# Patient Record
Sex: FEMALE | Race: BLACK OR AFRICAN AMERICAN | NOT HISPANIC OR LATINO | Employment: STUDENT | URBAN - METROPOLITAN AREA
[De-identification: names, ages, dates, MRNs, and addresses within clinical notes are randomized per-mention and may not be internally consistent; named-entity substitution may affect disease eponyms.]

---
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
## 2019-09-11 ENCOUNTER — APPOINTMENT (OUTPATIENT)
Dept: ULTRASOUND IMAGING | Facility: CLINIC | Age: 19
End: 2019-09-11
Attending: EMERGENCY MEDICINE
Payer: COMMERCIAL

## 2019-09-11 ENCOUNTER — HOSPITAL ENCOUNTER (EMERGENCY)
Facility: CLINIC | Age: 19
Discharge: HOME | End: 2019-09-11
Attending: EMERGENCY MEDICINE | Admitting: EMERGENCY MEDICINE
Payer: COMMERCIAL

## 2019-09-11 VITALS
WEIGHT: 155 LBS | SYSTOLIC BLOOD PRESSURE: 101 MMHG | HEART RATE: 64 BPM | OXYGEN SATURATION: 100 % | TEMPERATURE: 98.2 F | BODY MASS INDEX: 24.28 KG/M2 | DIASTOLIC BLOOD PRESSURE: 51 MMHG | RESPIRATION RATE: 18 BRPM

## 2019-09-11 DIAGNOSIS — O03.9 SPONTANEOUS ABORTION: ICD-10-CM

## 2019-09-11 LAB
ABO + RH BLD: NORMAL
ABO + RH BLD: NORMAL
ALBUMIN UR-MCNC: 10 MG/DL
ANION GAP SERPL CALCULATED.3IONS-SCNC: 11 MMOL/L (ref 3–14)
APPEARANCE UR: CLEAR
B-HCG SERPL-ACNC: 8662 IU/L (ref 0–5)
BACTERIA #/AREA URNS HPF: ABNORMAL /HPF
BASOPHILS # BLD AUTO: 0 10E9/L (ref 0–0.2)
BASOPHILS NFR BLD AUTO: 0.2 %
BILIRUB UR QL STRIP: NEGATIVE
BLD GP AB SCN SERPL QL: NORMAL
BLOOD BANK CMNT PATIENT-IMP: NORMAL
BUN SERPL-MCNC: 9 MG/DL (ref 7–30)
CALCIUM SERPL-MCNC: 8.7 MG/DL (ref 8.5–10.1)
CHLORIDE SERPL-SCNC: 106 MMOL/L (ref 94–109)
CO2 SERPL-SCNC: 24 MMOL/L (ref 20–32)
COLOR UR AUTO: YELLOW
CREAT SERPL-MCNC: 0.69 MG/DL (ref 0.52–1.04)
DIFFERENTIAL METHOD BLD: ABNORMAL
EOSINOPHIL # BLD AUTO: 0 10E9/L (ref 0–0.7)
EOSINOPHIL NFR BLD AUTO: 0.7 %
ERYTHROCYTE [DISTWIDTH] IN BLOOD BY AUTOMATED COUNT: 13 % (ref 10–15)
GFR SERPL CREATININE-BSD FRML MDRD: >90 ML/MIN/{1.73_M2}
GLUCOSE SERPL-MCNC: 85 MG/DL (ref 70–99)
GLUCOSE UR STRIP-MCNC: NEGATIVE MG/DL
HCT VFR BLD AUTO: 34.5 % (ref 35–47)
HGB BLD-MCNC: 11.6 G/DL (ref 11.7–15.7)
HGB UR QL STRIP: ABNORMAL
IMM GRANULOCYTES # BLD: 0 10E9/L (ref 0–0.4)
IMM GRANULOCYTES NFR BLD: 0.2 %
KETONES UR STRIP-MCNC: NEGATIVE MG/DL
LEUKOCYTE ESTERASE UR QL STRIP: ABNORMAL
LYMPHOCYTES # BLD AUTO: 1.8 10E9/L (ref 0.8–5.3)
LYMPHOCYTES NFR BLD AUTO: 32 %
MCH RBC QN AUTO: 26.7 PG (ref 26.5–33)
MCHC RBC AUTO-ENTMCNC: 33.6 G/DL (ref 31.5–36.5)
MCV RBC AUTO: 79 FL (ref 78–100)
MONOCYTES # BLD AUTO: 0.4 10E9/L (ref 0–1.3)
MONOCYTES NFR BLD AUTO: 7.2 %
MUCOUS THREADS #/AREA URNS LPF: PRESENT /LPF
NEUTROPHILS # BLD AUTO: 3.3 10E9/L (ref 1.6–8.3)
NEUTROPHILS NFR BLD AUTO: 59.7 %
NITRATE UR QL: NEGATIVE
NRBC # BLD AUTO: 0 10*3/UL
NRBC BLD AUTO-RTO: 0 /100
PH UR STRIP: 6 PH (ref 5–7)
PLATELET # BLD AUTO: 217 10E9/L (ref 150–450)
POTASSIUM SERPL-SCNC: 3.4 MMOL/L (ref 3.4–5.3)
RBC # BLD AUTO: 4.35 10E12/L (ref 3.8–5.2)
RBC #/AREA URNS AUTO: 1 /HPF (ref 0–2)
SODIUM SERPL-SCNC: 141 MMOL/L (ref 133–144)
SOURCE: ABNORMAL
SP GR UR STRIP: 1.02 (ref 1–1.03)
SPECIMEN EXP DATE BLD: NORMAL
SQUAMOUS #/AREA URNS AUTO: 8 /HPF (ref 0–1)
UROBILINOGEN UR STRIP-MCNC: NORMAL MG/DL (ref 0–2)
WBC # BLD AUTO: 5.6 10E9/L (ref 4–11)
WBC #/AREA URNS AUTO: 5 /HPF (ref 0–5)

## 2019-09-11 PROCEDURE — 86900 BLOOD TYPING SEROLOGIC ABO: CPT | Performed by: EMERGENCY MEDICINE

## 2019-09-11 PROCEDURE — 85025 COMPLETE CBC W/AUTO DIFF WBC: CPT | Performed by: EMERGENCY MEDICINE

## 2019-09-11 PROCEDURE — 86850 RBC ANTIBODY SCREEN: CPT | Performed by: EMERGENCY MEDICINE

## 2019-09-11 PROCEDURE — 99284 EMERGENCY DEPT VISIT MOD MDM: CPT | Mod: Z6 | Performed by: EMERGENCY MEDICINE

## 2019-09-11 PROCEDURE — 84702 CHORIONIC GONADOTROPIN TEST: CPT | Performed by: EMERGENCY MEDICINE

## 2019-09-11 PROCEDURE — 99284 EMERGENCY DEPT VISIT MOD MDM: CPT | Mod: 25 | Performed by: EMERGENCY MEDICINE

## 2019-09-11 PROCEDURE — 76801 OB US < 14 WKS SINGLE FETUS: CPT

## 2019-09-11 PROCEDURE — 25000132 ZZH RX MED GY IP 250 OP 250 PS 637: Performed by: EMERGENCY MEDICINE

## 2019-09-11 PROCEDURE — 86901 BLOOD TYPING SEROLOGIC RH(D): CPT | Performed by: EMERGENCY MEDICINE

## 2019-09-11 PROCEDURE — 81001 URINALYSIS AUTO W/SCOPE: CPT | Performed by: EMERGENCY MEDICINE

## 2019-09-11 PROCEDURE — 80048 BASIC METABOLIC PNL TOTAL CA: CPT | Performed by: EMERGENCY MEDICINE

## 2019-09-11 RX ORDER — OXYCODONE HYDROCHLORIDE 5 MG/1
5 TABLET ORAL ONCE
Status: COMPLETED | OUTPATIENT
Start: 2019-09-11 | End: 2019-09-11

## 2019-09-11 RX ADMIN — OXYCODONE HYDROCHLORIDE 5 MG: 5 TABLET ORAL at 02:33

## 2019-09-11 ASSESSMENT — ENCOUNTER SYMPTOMS
DIZZINESS: 1
LIGHT-HEADEDNESS: 1
CHILLS: 1

## 2019-09-11 NOTE — ED AVS SNAPSHOT
[facility], Emergency Department  [address]  [facility] [address]  Phone:  [phone]  Fax:  [phone]                                    Maynor Palomo   MRN: [id number]    Department:  [facility], Emergency Department   Date of Visit:  9/11/2019           After Visit Summary Signature Page    I have received my discharge instructions, and my questions have been answered. I have discussed any challenges I see with this plan with the nurse or doctor.    ..........................................................................................................................................  Patient/Patient Representative Signature      ..........................................................................................................................................  Patient Representative Print Name and Relationship to Patient    ..................................................               ................................................  Date                                   Time    ..........................................................................................................................................  Reviewed by Signature/Title    ...................................................              ..............................................  Date                                               Time          22EPIC Rev 08/18

## 2019-09-11 NOTE — DISCHARGE INSTRUCTIONS
Please make an appointment to follow up with OB/Gyn--[facility] (phone: [phone]) today to get an appointment by the end of the week    Continue take ibuprofen as you are directed    Return to the emergency department if you develop worsening pain, bleeding, fevers, chills or other concerning symptoms

## 2019-09-11 NOTE — ED PROVIDER NOTES
"    [facility] EMERGENCY DEPARTMENT ([facility])    19        History     Chief Complaint   Patient presents with     Vaginal Bleeding     Pt is 11-12 weeks pregnant, bleeding began 4 days ago, heavy bleeding today. Unsure how many pads she has used in last hour but states she has been saturating them. Endorses abd pain & dizziness.     The history is provided by the patient, a significant other and medical records.     Maynor Palomo is a 19 year old  female who is 7w1d pregnant by US (9/10/2019) with a past medical history significant for depression and PTSD who presents here to the Emergency Department due to vaginal bleeding.    Per chart review patient had presented to [facility] ED on 9/10/2019 due to vaginal bleeding. The patient had reported a 1 month history of vaginal bleeding in pregnancy. Patient was evaluated on  having an US done which demonstrated a single IUP. She was then again evaluated on  due to continued bleeding and US showed embryonic demise. Patient was told to follow up with OB/GYN on 2019 (for which she never did). Patient had called 911 on the morning of 9/10/2019 due to increased vaginal bleeding. Patients Hgb at the time dropped down to 10.9 and also had a drop in her beta-hCG. She was found to have moderate to mild bleeding but once that was cleared away she had no active bleeding. Pelvic US was done which showed a fluid collection in the endometrial canal that was believed to represent a spontaneous . OB/GYN was consulted and offered medical management to help expel the products of conception, D&C, or discharge home. Patient elected to go home with pain control and follow up with OB/GYN.    Here in the ED patient reports that she has been bleeding for the past 4 days. States yesterday the bleeding was so bad it felt like \"I peed myself and had blood rushing down my legs\". Patient notes that today is like a rerun of yesterday. " She believes she is going through 3-4 pads in an hour. She notes she is currently still bleeding. Patient has been passing clots but is unsure about tissue. Notes she is also lightheaded and dizzy. Patient is also endorsing vaginal stabbing pains and chills.    I have reviewed the Medications, Allergies, Past Medical and Surgical History, and Social History in the [employer] system.    Past Medical History:   Diagnosis Date     Constipation      Depressive disorder      Migraines        Past Surgical History:   Procedure Laterality Date     BIOPSY LYMPH NODE CERVICAL       LAPAROSCOPIC APPENDECTOMY N/A 1/4/2017    Procedure: LAPAROSCOPIC APPENDECTOMY;  Surgeon: Erick Casper MD;  Location: [facility] OR     TONSILLECTOMY, ADENOIDECTOMY WITH SHAVER, COMBINED         Family History   Problem Relation Age of Onset     Depression Mother      Substance Abuse Mother         sober 6 yrs     Depression Father      Mental Illness Father         anger issues     Substance Abuse Father         current user     Depression Maternal Grandmother      Substance Abuse Maternal Grandmother      Mental Illness Paternal Grandmother      Substance Abuse Paternal Grandmother      Mental Illness Paternal Grandfather      Substance Abuse Paternal Grandfather      Mental Illness Paternal Aunt      Substance Abuse Paternal Aunt      Mental Illness Paternal Uncle      Substance Abuse Paternal Uncle        Social History     Tobacco Use     Smoking status: Current Every Day Smoker     Smokeless tobacco: Never Used   Substance Use Topics     Alcohol use: Not Currently     Alcohol/week: 0.0 oz     Comment: last-2 weeks ago       No current facility-administered medications for this encounter.      Current Outpatient Medications   Medication     atropine 1 % ophthalmic solution     CloNIDine ER (KAPVAY) 0.1 MG *ER* 12-hr tablet     erythromycin (ROMYCIN) ophthalmic ointment     sertraline (ZOLOFT) 100 MG tablet     traZODone (DESYREL) 100 MG tablet      Facility-Administered Medications Ordered in Other Encounters   Medication     acetaminophen (TYLENOL) tablet 650 mg     calcium carbonate (TUMS) chewable tablet 500-1,000 mg     ibuprofen (ADVIL,MOTRIN) tablet 400 mg        Allergies   Allergen Reactions     Lactose GI Disturbance     Naproxen      Penicillins          Review of Systems   Constitutional: Positive for chills.   Genitourinary: Positive for vaginal bleeding and vaginal pain (Stabing).   Neurological: Positive for dizziness and light-headedness.   All other systems reviewed and are negative.      Physical Exam   BP: 106/76  Heart Rate: 94  Temp: 98.2  F (36.8  C)  Resp: 18  Weight: 70.3 kg (155 lb)  SpO2: 99 %      Physical Exam  Physical Exam   Constitutional: oriented to person, place, and time. appears well-developed and well-nourished.   HENT:   Head: Normocephalic and atraumatic.   Neck: Normal range of motion.   Pulmonary/Chest: Effort normal. No respiratory distress.   Cardiac: No murmurs, rubs, gallops. RRR.  Abdominal: Abdomen soft, nontender, nondistended. No rebound tenderness.  MSK: Long bones without deformity or evidence of trauma  Neurological: alert and oriented to person, place, and time.   Skin: Skin is warm and dry.   Psychiatric:  normal mood and affect.  behavior is normal. Thought content normal.   Gyn: Scant blood in the vaginal vault. Os appears closed. No active bleeding. No tissue noted.    ED Course   1:41 AM  The patient was seen and examined by Shayne Calhoun MD in Room ED05.        Procedures  Results for orders placed or performed during the hospital encounter of 19   US OB <14 Weeks W Transvaginal    Narrative    ULTRASOUND OBSTETRIC FIRST TRIMESTER WITH TRANSVAGINAL IMAGING    2019 3:51 AM     HISTORY: Pregnant. Incomplete  versus retained products of  conception.    COMPARISON: None.    TECHNIQUE: Endovaginal imaging was also performed to better evaluate  the endometrial cavity.    FINDINGS: A  fluid collection is present in the endometrial cavity  measuring 2.3 x 1.8 x 1.4 cm. No yolk sac or embryo is visualized  within the fluid collection. No myometrial masses. The right ovary is  unremarkable, measuring 2.1 x 1.8 x 1.8 cm. The left ovary is not  visualized. No adnexal masses. No free fluid in the pelvis.      Impression    IMPRESSION:   1. No definite products of conception are visualized in the  endometrial cavity. In the setting of a positive pregnancy test, the  differential diagnosis includes spontaneous , early  intrauterine patency and ectopic pregnancy. Recommend correlation with  beta-hCG levels and followup ultrasound as clinically indicated. Of  note, there is a 2 x 2 x 1 cm empty fluid collection in the  endometrial cavity that could represent a retained gestational sac in  the setting of a spontaneous .  2. Unremarkable right ovary. The left ovary is not visualized.  3. No free fluid in the pelvis.    RUSLAN PHOENIX MD   CBC with platelets differential   Result Value Ref Range    WBC 5.6 4.0 - 11.0 10e9/L    RBC Count 4.35 3.8 - 5.2 10e12/L    Hemoglobin 11.6 (L) 11.7 - 15.7 g/dL    Hematocrit 34.5 (L) 35.0 - 47.0 %    MCV 79 78 - 100 fl    MCH 26.7 26.5 - 33.0 pg    MCHC 33.6 31.5 - 36.5 g/dL    RDW 13.0 10.0 - 15.0 %    Platelet Count 217 150 - 450 10e9/L    Diff Method Automated Method     % Neutrophils 59.7 %    % Lymphocytes 32.0 %    % Monocytes 7.2 %    % Eosinophils 0.7 %    % Basophils 0.2 %    % Immature Granulocytes 0.2 %    Nucleated RBCs 0 0 /100    Absolute Neutrophil 3.3 1.6 - 8.3 10e9/L    Absolute Lymphocytes 1.8 0.8 - 5.3 10e9/L    Absolute Monocytes 0.4 0.0 - 1.3 10e9/L    Absolute Eosinophils 0.0 0.0 - 0.7 10e9/L    Absolute Basophils 0.0 0.0 - 0.2 10e9/L    Abs Immature Granulocytes 0.0 0 - 0.4 10e9/L    Absolute Nucleated RBC 0.0    Basic metabolic panel   Result Value Ref Range    Sodium 141 133 - 144 mmol/L    Potassium 3.4 3.4 - 5.3 mmol/L     Chloride 106 94 - 109 mmol/L    Carbon Dioxide 24 20 - 32 mmol/L    Anion Gap 11 3 - 14 mmol/L    Glucose 85 70 - 99 mg/dL    Urea Nitrogen 9 7 - 30 mg/dL    Creatinine 0.69 0.52 - 1.04 mg/dL    GFR Estimate >90 >60 mL/min/[1.73_m2]    GFR Estimate If Black >90 >60 mL/min/[1.73_m2]    Calcium 8.7 8.5 - 10.1 mg/dL   UA reflex to Microscopic and Culture   Result Value Ref Range    Color Urine Yellow     Appearance Urine Clear     Glucose Urine Negative NEG^Negative mg/dL    Bilirubin Urine Negative NEG^Negative    Ketones Urine Negative NEG^Negative mg/dL    Specific Gravity Urine 1.024 1.003 - 1.035    Blood Urine Moderate (A) NEG^Negative    pH Urine 6.0 5.0 - 7.0 pH    Protein Albumin Urine 10 (A) NEG^Negative mg/dL    Urobilinogen mg/dL Normal 0.0 - 2.0 mg/dL    Nitrite Urine Negative NEG^Negative    Leukocyte Esterase Urine Trace (A) NEG^Negative    Source Midstream Urine     RBC Urine 1 0 - 2 /HPF    WBC Urine 5 0 - 5 /HPF    Bacteria Urine Few (A) NEG^Negative /HPF    Squamous Epithelial /HPF Urine 8 (H) 0 - 1 /HPF    Mucous Urine Present (A) NEG^Negative /LPF   HCG quantitative pregnancy (blood)   Result Value Ref Range    HCG Quantitative Serum 8,662 (H) 0 - 5 IU/L   ABO/Rh type and screen   Result Value Ref Range    ABO A     RH(D) Pos     Antibody Screen Neg     Test Valid Only At          [facility]    Specimen Expires 09/14/2019        Labs Ordered and Resulted from Time of ED Arrival Up to the Time of Departure from the ED - No data to display         Assessments & Plan (with Medical Decision Making)   MDM  Patient presented with ongoing vaginal bleeding after spontaneous miscarriage.  She was offered a D&C versus medical expulsion yesterday at [facility] however she declined this, she has not followed up.  She is hemodynamically stable here.  Will get labs, ultrasound and likely discussed with OB.    Re eval: The patient has not had significant bleeding  here in the emergency department.  Hemoglobin is stable.  Vitals are stable.  The patient appears well and is ambulating without difficulty.  Discussed with OB regarding lab results and ultrasound, they offered misoprostol versus an outpatient D&C.  The patient is not interested in either at this point.  OB recommend follow-up this week, they were given a phone number to get in touch with OB to schedule appointment either tomorrow or by the end of the week.  Patient will return if symptoms are worsening or if she does have developing fevers, chills or any other concerning symptoms.  Discussed ibuprofen use and to return if symptoms are worsening.  Strongly encouraged her to follow-up as soon as she is able.  I have reviewed the nursing notes.    I have reviewed the findings, diagnosis, plan and need for follow up with the patient.    New Prescriptions    No medications on file       Final diagnoses:   Spontaneous      Gerard COYLE, am serving as a trained medical scribe to document services personally performed by Shayne Calhoun MD, based on the provider's statements to me.   Shayne COYLE MD, was physically present and have reviewed and verified the accuracy of this note documented by Gerard Aaron.    2019   [facility], [place], EMERGENCY DEPARTMENT     Shayne Calhoun MD  19 [id number]

## 2019-09-16 ENCOUNTER — TELEPHONE (OUTPATIENT)
Dept: OBGYN | Facility: CLINIC | Age: 19
End: 2019-09-16

## 2019-09-16 ENCOUNTER — HOSPITAL ENCOUNTER (EMERGENCY)
Facility: CLINIC | Age: 19
Discharge: HOME | End: 2019-09-17
Attending: FAMILY MEDICINE | Admitting: FAMILY MEDICINE
Payer: COMMERCIAL

## 2019-09-16 ENCOUNTER — APPOINTMENT (OUTPATIENT)
Dept: ULTRASOUND IMAGING | Facility: CLINIC | Age: 19
End: 2019-09-16
Attending: FAMILY MEDICINE
Payer: COMMERCIAL

## 2019-09-16 DIAGNOSIS — N93.9 VAGINAL BLEEDING: ICD-10-CM

## 2019-09-16 DIAGNOSIS — O03.9 SPONTANEOUS ABORTION: ICD-10-CM

## 2019-09-16 LAB
ABO + RH BLD: NORMAL
ABO + RH BLD: NORMAL
ALBUMIN SERPL-MCNC: 4.1 G/DL (ref 3.4–5)
ALP SERPL-CCNC: 58 U/L (ref 40–150)
ALT SERPL W P-5'-P-CCNC: 29 U/L (ref 0–50)
ANION GAP SERPL CALCULATED.3IONS-SCNC: 6 MMOL/L (ref 3–14)
APTT PPP: 31 SEC (ref 22–37)
AST SERPL W P-5'-P-CCNC: 22 U/L (ref 0–45)
B-HCG SERPL-ACNC: 726 IU/L (ref 0–5)
BASOPHILS # BLD AUTO: 0 10E9/L (ref 0–0.2)
BASOPHILS NFR BLD AUTO: 0.2 %
BILIRUB SERPL-MCNC: 0.2 MG/DL (ref 0.2–1.3)
BLD GP AB SCN SERPL QL: NORMAL
BLOOD BANK CMNT PATIENT-IMP: NORMAL
BUN SERPL-MCNC: 8 MG/DL (ref 7–30)
CALCIUM SERPL-MCNC: 9 MG/DL (ref 8.5–10.1)
CHLORIDE SERPL-SCNC: 106 MMOL/L (ref 94–109)
CO2 SERPL-SCNC: 29 MMOL/L (ref 20–32)
CREAT SERPL-MCNC: 0.78 MG/DL (ref 0.52–1.04)
DIFFERENTIAL METHOD BLD: NORMAL
EOSINOPHIL # BLD AUTO: 0.1 10E9/L (ref 0–0.7)
EOSINOPHIL NFR BLD AUTO: 1.4 %
ERYTHROCYTE [DISTWIDTH] IN BLOOD BY AUTOMATED COUNT: 13.2 % (ref 10–15)
GFR SERPL CREATININE-BSD FRML MDRD: >90 ML/MIN/{1.73_M2}
GLUCOSE SERPL-MCNC: 67 MG/DL (ref 70–99)
HCT VFR BLD AUTO: 36.6 % (ref 35–47)
HGB BLD-MCNC: 12.4 G/DL (ref 11.7–15.7)
IMM GRANULOCYTES # BLD: 0 10E9/L (ref 0–0.4)
IMM GRANULOCYTES NFR BLD: 0 %
INR PPP: 1.02 (ref 0.86–1.14)
LYMPHOCYTES # BLD AUTO: 2.6 10E9/L (ref 0.8–5.3)
LYMPHOCYTES NFR BLD AUTO: 51.4 %
MCH RBC QN AUTO: 27.1 PG (ref 26.5–33)
MCHC RBC AUTO-ENTMCNC: 33.9 G/DL (ref 31.5–36.5)
MCV RBC AUTO: 80 FL (ref 78–100)
MONOCYTES # BLD AUTO: 0.3 10E9/L (ref 0–1.3)
MONOCYTES NFR BLD AUTO: 5 %
NEUTROPHILS # BLD AUTO: 2.1 10E9/L (ref 1.6–8.3)
NEUTROPHILS NFR BLD AUTO: 42 %
NRBC # BLD AUTO: 0 10*3/UL
NRBC BLD AUTO-RTO: 0 /100
PLATELET # BLD AUTO: 237 10E9/L (ref 150–450)
POTASSIUM SERPL-SCNC: 3.5 MMOL/L (ref 3.4–5.3)
PROT SERPL-MCNC: 7.9 G/DL (ref 6.8–8.8)
RBC # BLD AUTO: 4.58 10E12/L (ref 3.8–5.2)
SODIUM SERPL-SCNC: 141 MMOL/L (ref 133–144)
SPECIMEN EXP DATE BLD: NORMAL
WBC # BLD AUTO: 5 10E9/L (ref 4–11)

## 2019-09-16 PROCEDURE — 80053 COMPREHEN METABOLIC PANEL: CPT | Performed by: FAMILY MEDICINE

## 2019-09-16 PROCEDURE — 86850 RBC ANTIBODY SCREEN: CPT | Performed by: FAMILY MEDICINE

## 2019-09-16 PROCEDURE — 86900 BLOOD TYPING SEROLOGIC ABO: CPT | Performed by: FAMILY MEDICINE

## 2019-09-16 PROCEDURE — 84702 CHORIONIC GONADOTROPIN TEST: CPT | Performed by: FAMILY MEDICINE

## 2019-09-16 PROCEDURE — 76801 OB US < 14 WKS SINGLE FETUS: CPT

## 2019-09-16 PROCEDURE — 86901 BLOOD TYPING SEROLOGIC RH(D): CPT | Performed by: FAMILY MEDICINE

## 2019-09-16 PROCEDURE — 99284 EMERGENCY DEPT VISIT MOD MDM: CPT | Mod: Z6 | Performed by: FAMILY MEDICINE

## 2019-09-16 PROCEDURE — 85730 THROMBOPLASTIN TIME PARTIAL: CPT | Performed by: FAMILY MEDICINE

## 2019-09-16 PROCEDURE — 99284 EMERGENCY DEPT VISIT MOD MDM: CPT | Mod: 25

## 2019-09-16 PROCEDURE — 85610 PROTHROMBIN TIME: CPT | Performed by: FAMILY MEDICINE

## 2019-09-16 PROCEDURE — 85025 COMPLETE CBC W/AUTO DIFF WBC: CPT | Performed by: FAMILY MEDICINE

## 2019-09-16 RX ORDER — SODIUM CHLORIDE 9 MG/ML
1000 INJECTION, SOLUTION INTRAVENOUS CONTINUOUS
Status: DISCONTINUED | OUTPATIENT
Start: 2019-09-16 | End: 2019-09-17 | Stop reason: HOSPADM

## 2019-09-16 RX ORDER — LIDOCAINE 40 MG/G
CREAM TOPICAL
Status: DISCONTINUED | OUTPATIENT
Start: 2019-09-16 | End: 2019-09-17 | Stop reason: HOSPADM

## 2019-09-16 ASSESSMENT — ENCOUNTER SYMPTOMS
PHOTOPHOBIA: 1
VOMITING: 0
NAUSEA: 1
LIGHT-HEADEDNESS: 0
HEADACHES: 1
EYE REDNESS: 0
ARTHRALGIAS: 0
SHORTNESS OF BREATH: 0
ABDOMINAL PAIN: 1
DIFFICULTY URINATING: 0
CHILLS: 1
DIZZINESS: 0
CONFUSION: 0
FEVER: 1
COLOR CHANGE: 0
NECK STIFFNESS: 0

## 2019-09-16 NOTE — ED TRIAGE NOTES
Pt was eight weeks preg and started to miss carry two weeks ago and has been having ongoing bleeding and is still passing large clots.  Pt has been to the ED multiple times for this issue.  See chart.

## 2019-09-16 NOTE — TELEPHONE ENCOUNTER
Patient calling for a follow-up appointment with an MD regarding her ER visit. She stated that she needs to schedule a D&C. See ER notes in chart.     US as follows:      Impression     IMPRESSION:   1. No definite products of conception are visualized in the  endometrial cavity. In the setting of a positive pregnancy test, the  differential diagnosis includes spontaneous , early  intrauterine patency and ectopic pregnancy. Recommend correlation with  beta-hCG levels and followup ultrasound as clinically indicated. Of  note, there is a 2 x 2 x 1 cm empty fluid collection in the  endometrial cavity that could represent a retained gestational sac in  the setting of a spontaneous .  2. Unremarkable right ovary. The left ovary is not visualized.  3. No free fluid in the pelvis.     Beta hcg 8662 on .    Patient stated that she is having heavy bleeding soaking through pads frequently and passing large blood clots that are bigger than a golf ball. Informed patient that she should present back to ER for acute evaluation. They would be able to repeat US and lab work, start IV hydration, etc. Patient will go now and call back to clinic if further follow-up is needed.  Delores Murguia RN

## 2019-09-16 NOTE — ED AVS SNAPSHOT
[facility], [place], Emergency Department  [address]  [facility] [address]  Phone:  [phone]  Fax:  [phone]                                    Maynor Palomo   MRN: [id number]    Department:  [facility], Emergency Department   Date of Visit:  9/16/2019           After Visit Summary Signature Page    I have received my discharge instructions, and my questions have been answered. I have discussed any challenges I see with this plan with the nurse or doctor.    ..........................................................................................................................................  Patient/Patient Representative Signature      ..........................................................................................................................................  Patient Representative Print Name and Relationship to Patient    ..................................................               ................................................  Date                                   Time    ..........................................................................................................................................  Reviewed by Signature/Title    ...................................................              ..............................................  Date                                               Time          22EPIC Rev 08/18

## 2019-09-17 VITALS
BODY MASS INDEX: 24.28 KG/M2 | RESPIRATION RATE: 18 BRPM | DIASTOLIC BLOOD PRESSURE: 91 MMHG | SYSTOLIC BLOOD PRESSURE: 135 MMHG | WEIGHT: 155 LBS | HEART RATE: 90 BPM | TEMPERATURE: 98.6 F | OXYGEN SATURATION: 100 %

## 2019-09-17 ASSESSMENT — ENCOUNTER SYMPTOMS
WEAKNESS: 0
ACTIVITY CHANGE: 0

## 2019-09-17 NOTE — ED PROVIDER NOTES
"  History     Chief Complaint   Patient presents with     Abdominal Pain     Vaginal Bleeding     HPI  Maynor Palomo is a 19 year old female with a history of migraines who presents for evaluation of vaginal bleeding and abdominal pain. The patient reports she was 8.5 weeks pregnant (confirmed by US on 9/10/19) but about 3 weeks ago, developed vaginal bleeding. She states she's since had a confirmed spontaneous  -- her 9/10/19 ED visit to [facility] found a drop in Hgb down to 10.9 as well as drop in beta-hCG. Additionally, she had a pelvic US which showed a fluid collection in the endometrial canal that was believed to represent a spontaneous .     She presents today because her bleeding has not improved and notes she'll even have \"waves\" where she'll pass large clots. In addition to her bleeding, she complains of \"crampy\", diffuse abdominal pain, nausea, a 10/10 headache with an associated photophobia, and chills/hot flashes. Patient denies dizziness, lightheadedness, fevers, or vomiting. She states she's been taking Tylenol and ibuprofen, though neither have been effective.     ----------------------------------------------------------------------------------------------------------------------------------------  ULTRASOUND OBSTETRICAL 9/10/2019 6:17 AM    1.  There is an irregular fluid collection in the endometrial canal with a mean diameter of 2.1 cm. This presumably represents a gestational sac and corresponds to 7 weeks 1 day. No yolk sac or fetal pole is seen. Given that a fetal pole was seen on the study of 2019, the findings on the current examination presumably represent a spontaneous . Close follow-up recommended.  2.  1.9 cm complex possibly hemorrhagic right ovarian cyst.  3.  Trace free fluid in the pelvis.    Past Medical History:   Diagnosis Date     Constipation      Depressive disorder      Migraines        Past Surgical History:   Procedure Laterality " Date     BIOPSY LYMPH NODE CERVICAL       LAPAROSCOPIC APPENDECTOMY N/A 1/4/2017    Procedure: LAPAROSCOPIC APPENDECTOMY;  Surgeon: Erick Casper MD;  Location: [facility] OR     TONSILLECTOMY, ADENOIDECTOMY WITH SHAVER, COMBINED         Family History   Problem Relation Age of Onset     Depression Mother      Substance Abuse Mother         sober 6 yrs     Depression Father      Mental Illness Father         anger issues     Substance Abuse Father         current user     Depression Maternal Grandmother      Substance Abuse Maternal Grandmother      Mental Illness Paternal Grandmother      Substance Abuse Paternal Grandmother      Mental Illness Paternal Grandfather      Substance Abuse Paternal Grandfather      Mental Illness Paternal Aunt      Substance Abuse Paternal Aunt      Mental Illness Paternal Uncle      Substance Abuse Paternal Uncle        Social History     Tobacco Use     Smoking status: Current Every Day Smoker     Smokeless tobacco: Never Used   Substance Use Topics     Alcohol use: Not Currently     Alcohol/week: 0.0 oz     Comment: last-2 weeks ago       No current facility-administered medications for this encounter.      No current outpatient medications on file.     Facility-Administered Medications Ordered in Other Encounters   Medication     acetaminophen (TYLENOL) tablet 650 mg     calcium carbonate (TUMS) chewable tablet 500-1,000 mg     ibuprofen (ADVIL,MOTRIN) tablet 400 mg        Allergies   Allergen Reactions     Lactose GI Disturbance     Naproxen      Penicillins      I have reviewed the Medications, Allergies, Past Medical and Surgical History, and Social History in the Epic system.    Review of Systems   Constitutional: Positive for chills and fever (subjective). Negative for activity change.   HENT: Negative for congestion.    Eyes: Positive for photophobia. Negative for redness.   Respiratory: Negative for shortness of breath.    Cardiovascular: Negative for chest pain.    Gastrointestinal: Positive for abdominal pain and nausea. Negative for vomiting.   Genitourinary: Positive for vaginal bleeding (intermittent clots not every day). Negative for difficulty urinating.   Musculoskeletal: Negative for arthralgias and neck stiffness.   Skin: Negative for color change.   Neurological: Positive for headaches. Negative for dizziness, syncope, weakness and light-headedness.   Psychiatric/Behavioral: Negative for confusion.   All other systems reviewed and are negative.      Physical Exam   BP: 127/84  Pulse: 72  Temp: 98.6  F (37  C)  Resp: 16  Weight: 70.3 kg (155 lb)  SpO2: 100 %      Physical Exam   Constitutional: She is oriented to person, place, and time. She appears well-developed and well-nourished. No distress.   Patient currently  eating a sandwich.  Appears nontoxic   HENT:   Head: Normocephalic and atraumatic.   Eyes: No scleral icterus.   Neck: Normal range of motion. Neck supple.   Cardiovascular: Normal rate.   Pulmonary/Chest: Effort normal and breath sounds normal.   Abdominal: Soft. She exhibits no distension. There is tenderness. There is no guarding.   Minimal nonspecific lower abdominal tenderness   Musculoskeletal: She exhibits no edema or deformity.   Neurological: She is alert and oriented to person, place, and time.   Nonfocal.   Skin: Skin is warm and dry. Capillary refill takes less than 2 seconds. No rash noted. She is not diaphoretic. No erythema. No pallor.   Psychiatric:   Appropriate   Nursing note and vitals reviewed.      ED Course        Procedures        Patient value in the ER discussed with OB also recognitions.  CBC chemistries were drawn along with hCG.  hCG was 726 which is down.  Glucose was 67 patient was able to eat here in the ER.  Ultrasound was done findings revealed small amount of fluid in the endometrial cavity no other signs retained products etc.  Discussed with OB at this point patient does have an appointment tomorrow with her OB and  she can follow-up with this with her other labs stable IV fluids she appears stable there is no indication for intervention at this point.  Patient comfortable and wanted to be discharged.    Critical Care time:  none             Labs Ordered and Resulted from Time of ED Arrival Up to the Time of Departure from the ED   COMPREHENSIVE METABOLIC PANEL - Abnormal; Notable for the following components:       Result Value    Glucose 67 (*)     All other components within normal limits   HCG QUANTITATIVE PREGNANCY - Abnormal; Notable for the following components:    HCG Quantitative Serum 726 (*)     All other components within normal limits   CBC WITH PLATELETS DIFFERENTIAL   PARTIAL THROMBOPLASTIN TIME   INR   PERIPHERAL IV CATHETER   ABO/RH TYPE AND SCREEN     Results for orders placed or performed during the hospital encounter of 19   US OB <14 Weeks w Transvaginal Single    Narrative    US OB <14 WEEKS WITH TRANSVAGINAL SINGLE  2019 10:58 PM      HISTORY: Pain and vaginal bleeding with incomplete .     COMPARISON: 2019.    FINDINGS: Transabdominal and endovaginal imaging was performed. There  is a trace amount of fluid in the endometrial cavity. The endometrium  is overall within normal limits, measuring up to 0.6 cm. No retained  products of conception are evident. The ovaries are normal in size and  appearance bilaterally. No adnexal mass. There is a small amount of  clear free pelvic fluid.      Impression    IMPRESSION: Small amount of fluid in the endometrial cavity. No  evidence of retained products of conception.    DINA GUIDO MD   CBC with platelets differential   Result Value Ref Range    WBC 5.0 4.0 - 11.0 10e9/L    RBC Count 4.58 3.8 - 5.2 10e12/L    Hemoglobin 12.4 11.7 - 15.7 g/dL    Hematocrit 36.6 35.0 - 47.0 %    MCV 80 78 - 100 fl    MCH 27.1 26.5 - 33.0 pg    MCHC 33.9 31.5 - 36.5 g/dL    RDW 13.2 10.0 - 15.0 %    Platelet Count 237 150 - 450 10e9/L    Diff Method  Automated Method     % Neutrophils 42.0 %    % Lymphocytes 51.4 %    % Monocytes 5.0 %    % Eosinophils 1.4 %    % Basophils 0.2 %    % Immature Granulocytes 0.0 %    Nucleated RBCs 0 0 /100    Absolute Neutrophil 2.1 1.6 - 8.3 10e9/L    Absolute Lymphocytes 2.6 0.8 - 5.3 10e9/L    Absolute Monocytes 0.3 0.0 - 1.3 10e9/L    Absolute Eosinophils 0.1 0.0 - 0.7 10e9/L    Absolute Basophils 0.0 0.0 - 0.2 10e9/L    Abs Immature Granulocytes 0.0 0 - 0.4 10e9/L    Absolute Nucleated RBC 0.0    Partial thromboplastin time   Result Value Ref Range    PTT 31 22 - 37 sec   INR   Result Value Ref Range    INR 1.02 0.86 - 1.14   Comprehensive metabolic panel   Result Value Ref Range    Sodium 141 133 - 144 mmol/L    Potassium 3.5 3.4 - 5.3 mmol/L    Chloride 106 94 - 109 mmol/L    Carbon Dioxide 29 20 - 32 mmol/L    Anion Gap 6 3 - 14 mmol/L    Glucose 67 (L) 70 - 99 mg/dL    Urea Nitrogen 8 7 - 30 mg/dL    Creatinine 0.78 0.52 - 1.04 mg/dL    GFR Estimate >90 >60 mL/min/[1.73_m2]    GFR Estimate If Black >90 >60 mL/min/[1.73_m2]    Calcium 9.0 8.5 - 10.1 mg/dL    Bilirubin Total 0.2 0.2 - 1.3 mg/dL    Albumin 4.1 3.4 - 5.0 g/dL    Protein Total 7.9 6.8 - 8.8 g/dL    Alkaline Phosphatase 58 40 - 150 U/L    ALT 29 0 - 50 U/L    AST 22 0 - 45 U/L   HCG quantitative pregnancy   Result Value Ref Range    HCG Quantitative Serum 726 (H) 0 - 5 IU/L   ABO/Rh type and screen   Result Value Ref Range    ABO A     RH(D) Pos     Antibody Screen Neg     Test Valid Only At          [facility]    Specimen Expires 09/19/2019             Assessments & Plan (with Medical Decision Making) 19-year-old female with ongoing bleeding for the last 2 weeks she is hadAn incomplete AB has had multiple ultrasounds ER visits following serial hCG.  Her Rh is positive.  Her hCG is down to 720 currently at this point her ultrasound shows a little bit of fluid in the endometrial canal but no retained products or  other abnormalities.  At this point there is no concern for ectopic and more likely this is all represents resolution of a spontaneous AB.  Patient hemoglobin stable vitals stable given IV fluids we will follow-up with her OB appointment tomorrow we discussed with OB and agree with plan.  Patient comfortable with plan of discharge stable.           I have reviewed the nursing notes.    I have reviewed the findings, diagnosis, plan and need for follow up with the patient.    There are no discharge medications for this patient.      Final diagnoses:   Vaginal bleeding   Spontaneous    I, Chsa Macario, am serving as a trained medical scribe to document services personally performed by Jose Alberto Gonzalez MD, based on the provider's statements to me.   IJose Alberto MD, was physically present and have reviewed and verified the accuracy of this note documented by Chas Macario.    2019   [facility], [facility] EMERGENCY DEPARTMENT      This note was created at least in part by the use of dragon voice dictation system. Inadvertent typographical errors may still exist.  Jose Alberto Gonzalez MD.         Jose Alberto Gonzalez MD  19 [id number]

## 2019-09-17 NOTE — DISCHARGE INSTRUCTIONS
Home.  Your labs are improving and stable hemoglobin.  Pregnancy hormone test down to 726.  Your ultrasound does not show any retained products in the uterus.  See OB tomorrow as planned.  Return if any concerns.    Results for orders placed or performed during the hospital encounter of 19   US OB <14 Weeks w Transvaginal Single    Narrative    US OB <14 WEEKS WITH TRANSVAGINAL SINGLE  2019 10:58 PM      HISTORY: Pain and vaginal bleeding with incomplete .     COMPARISON: 2019.    FINDINGS: Transabdominal and endovaginal imaging was performed. There  is a trace amount of fluid in the endometrial cavity. The endometrium  is overall within normal limits, measuring up to 0.6 cm. No retained  products of conception are evident. The ovaries are normal in size and  appearance bilaterally. No adnexal mass. There is a small amount of  clear free pelvic fluid.      Impression    IMPRESSION: Small amount of fluid in endometrial cavity. No evidence  of retained products of conception.   CBC with platelets differential   Result Value Ref Range    WBC 5.0 4.0 - 11.0 10e9/L    RBC Count 4.58 3.8 - 5.2 10e12/L    Hemoglobin 12.4 11.7 - 15.7 g/dL    Hematocrit 36.6 35.0 - 47.0 %    MCV 80 78 - 100 fl    MCH 27.1 26.5 - 33.0 pg    MCHC 33.9 31.5 - 36.5 g/dL    RDW 13.2 10.0 - 15.0 %    Platelet Count 237 150 - 450 10e9/L    Diff Method Automated Method     % Neutrophils 42.0 %    % Lymphocytes 51.4 %    % Monocytes 5.0 %    % Eosinophils 1.4 %    % Basophils 0.2 %    % Immature Granulocytes 0.0 %    Nucleated RBCs 0 0 /100    Absolute Neutrophil 2.1 1.6 - 8.3 10e9/L    Absolute Lymphocytes 2.6 0.8 - 5.3 10e9/L    Absolute Monocytes 0.3 0.0 - 1.3 10e9/L    Absolute Eosinophils 0.1 0.0 - 0.7 10e9/L    Absolute Basophils 0.0 0.0 - 0.2 10e9/L    Abs Immature Granulocytes 0.0 0 - 0.4 10e9/L    Absolute Nucleated RBC 0.0    Partial thromboplastin time   Result Value Ref Range    PTT 31 22 - 37 sec   INR   Result  Value Ref Range    INR 1.02 0.86 - 1.14   Comprehensive metabolic panel   Result Value Ref Range    Sodium 141 133 - 144 mmol/L    Potassium 3.5 3.4 - 5.3 mmol/L    Chloride 106 94 - 109 mmol/L    Carbon Dioxide 29 20 - 32 mmol/L    Anion Gap 6 3 - 14 mmol/L    Glucose 67 (L) 70 - 99 mg/dL    Urea Nitrogen 8 7 - 30 mg/dL    Creatinine 0.78 0.52 - 1.04 mg/dL    GFR Estimate >90 >60 mL/min/[1.73_m2]    GFR Estimate If Black >90 >60 mL/min/[1.73_m2]    Calcium 9.0 8.5 - 10.1 mg/dL    Bilirubin Total 0.2 0.2 - 1.3 mg/dL    Albumin 4.1 3.4 - 5.0 g/dL    Protein Total 7.9 6.8 - 8.8 g/dL    Alkaline Phosphatase 58 40 - 150 U/L    ALT 29 0 - 50 U/L    AST 22 0 - 45 U/L   HCG quantitative pregnancy   Result Value Ref Range    HCG Quantitative Serum 726 (H) 0 - 5 IU/L   ABO/Rh type and screen   Result Value Ref Range    ABO A     RH(D) Pos     Antibody Screen Neg     Test Valid Only At          [facility]    Specimen Expires 09/19/2019

## 2019-10-25 ENCOUNTER — HOSPITAL ENCOUNTER (EMERGENCY)
Facility: CLINIC | Age: 19
Discharge: HOME | End: 2019-10-25
Attending: EMERGENCY MEDICINE | Admitting: EMERGENCY MEDICINE
Payer: COMMERCIAL

## 2019-10-25 VITALS
RESPIRATION RATE: 16 BRPM | OXYGEN SATURATION: 99 % | BODY MASS INDEX: 24.42 KG/M2 | HEART RATE: 95 BPM | DIASTOLIC BLOOD PRESSURE: 78 MMHG | WEIGHT: 155.9 LBS | SYSTOLIC BLOOD PRESSURE: 137 MMHG | TEMPERATURE: 98.7 F

## 2019-10-25 DIAGNOSIS — N12 PYELONEPHRITIS: ICD-10-CM

## 2019-10-25 DIAGNOSIS — N84.1 CERVICAL POLYP: ICD-10-CM

## 2019-10-25 LAB
ALBUMIN UR-MCNC: 10 MG/DL
APPEARANCE UR: CLEAR
BACTERIA #/AREA URNS HPF: ABNORMAL /HPF
BILIRUB UR QL STRIP: NEGATIVE
COLOR UR AUTO: YELLOW
GLUCOSE UR STRIP-MCNC: NEGATIVE MG/DL
HCG UR QL: NEGATIVE
HGB UR QL STRIP: ABNORMAL
KETONES UR STRIP-MCNC: NEGATIVE MG/DL
LEUKOCYTE ESTERASE UR QL STRIP: NEGATIVE
MUCOUS THREADS #/AREA URNS LPF: PRESENT /LPF
NITRATE UR QL: NEGATIVE
PH UR STRIP: 7 PH (ref 5–7)
RBC #/AREA URNS AUTO: >182 /HPF (ref 0–2)
SOURCE: ABNORMAL
SP GR UR STRIP: 1.02 (ref 1–1.03)
SQUAMOUS #/AREA URNS AUTO: 11 /HPF (ref 0–1)
UROBILINOGEN UR STRIP-MCNC: NORMAL MG/DL (ref 0–2)
WBC #/AREA URNS AUTO: 1 /HPF (ref 0–5)

## 2019-10-25 PROCEDURE — 25000128 H RX IP 250 OP 636: Performed by: STUDENT IN AN ORGANIZED HEALTH CARE EDUCATION/TRAINING PROGRAM

## 2019-10-25 PROCEDURE — 81001 URINALYSIS AUTO W/SCOPE: CPT | Performed by: STUDENT IN AN ORGANIZED HEALTH CARE EDUCATION/TRAINING PROGRAM

## 2019-10-25 PROCEDURE — 96372 THER/PROPH/DIAG INJ SC/IM: CPT

## 2019-10-25 PROCEDURE — 25000132 ZZH RX MED GY IP 250 OP 250 PS 637: Performed by: STUDENT IN AN ORGANIZED HEALTH CARE EDUCATION/TRAINING PROGRAM

## 2019-10-25 PROCEDURE — 99284 EMERGENCY DEPT VISIT MOD MDM: CPT | Mod: Z6 | Performed by: EMERGENCY MEDICINE

## 2019-10-25 PROCEDURE — 99284 EMERGENCY DEPT VISIT MOD MDM: CPT

## 2019-10-25 PROCEDURE — 81025 URINE PREGNANCY TEST: CPT | Performed by: STUDENT IN AN ORGANIZED HEALTH CARE EDUCATION/TRAINING PROGRAM

## 2019-10-25 RX ORDER — CIPROFLOXACIN 500 MG/1
500 TABLET, FILM COATED ORAL ONCE
Status: COMPLETED | OUTPATIENT
Start: 2019-10-25 | End: 2019-10-25

## 2019-10-25 RX ORDER — CIPROFLOXACIN 500 MG/1
500 TABLET, FILM COATED ORAL 2 TIMES DAILY
Qty: 9 TABLET | Refills: 0 | Status: SHIPPED | OUTPATIENT
Start: 2019-10-25 | End: 2019-10-30

## 2019-10-25 RX ORDER — KETOROLAC TROMETHAMINE 30 MG/ML
30 INJECTION, SOLUTION INTRAMUSCULAR; INTRAVENOUS ONCE
Status: COMPLETED | OUTPATIENT
Start: 2019-10-25 | End: 2019-10-25

## 2019-10-25 RX ADMIN — CIPROFLOXACIN HYDROCHLORIDE 500 MG: 500 TABLET, FILM COATED ORAL at 22:45

## 2019-10-25 RX ADMIN — KETOROLAC TROMETHAMINE 30 MG: 30 INJECTION, SOLUTION INTRAMUSCULAR at 22:06

## 2019-10-25 NOTE — ED AVS SNAPSHOT
[facility], [place], Emergency Department  [address]  [facility] [address]  Phone:  [phone]  Fax:  [phone]                                    Maynor Palomo   MRN: [id number]    Department:  [facility], Emergency Department   Date of Visit:  10/25/2019           After Visit Summary Signature Page    I have received my discharge instructions, and my questions have been answered. I have discussed any challenges I see with this plan with the nurse or doctor.    ..........................................................................................................................................  Patient/Patient Representative Signature      ..........................................................................................................................................  Patient Representative Print Name and Relationship to Patient    ..................................................               ................................................  Date                                   Time    ..........................................................................................................................................  Reviewed by Signature/Title    ...................................................              ..............................................  Date                                               Time          22EPIC Rev 08/18

## 2019-10-26 NOTE — ED NOTES
Bed: ED06  Expected date:   Expected time:   Means of arrival:   Comments:  Hold for triage Vag Bleed

## 2019-10-26 NOTE — ED NOTES
Client denied discharge vitals, child needing to get home. Discharge instructions and prescription given.

## 2019-10-26 NOTE — ED TRIAGE NOTES
Pt states she was recently hospitalized for pylo and shortly after being discharge from [facility] on the 23 started having vaginal bleeding yesterday. Pt states it started light yesterday and today has started getting heavier.

## 2019-10-26 NOTE — ED PROVIDER NOTES
"  History     Chief Complaint   Patient presents with     Vaginal Bleeding     HPI  Damianne \"Gladys\" Kushal Palomo is a 19 year old female who presents to the ED saying \"I have vaginal bleeding and I need a prescription.\" Gladys was discharged from [facility] on 10/24 after 3 day admission for pyelonephritis with urine cultures positive for E coli sensitive to ciprofloxacin. She was prescribed 4.5 day course of 500mg ciprofloxacin BID, but the pharmacy she went to did not have any available, so she presents to us asking for a new prescription. She continues to have abdominal pain, which is suprapubic and left lower quadrant. Also has nausea with occasional vomiting. Has bilateral back pain. No hematuria, dysuria, or urgency. No constipation or diarrhea.     Chart review suggests she also had pyelonephritis in Jan 2019, diagnosed at [facility] ED and treated with 14 day course of Keflex. She never received test of cure. Subsequent UA done 8/11/19 during pregnancy confirmation visit showed resolution of hematuria; but persistent bacteriuria with leuk esterase and nitrates. During most recent admission at [facility], CT Abd/Pelv with contrast did not show evidence of hydronephrosis or pyelonephritis.     She also is concerned about light vaginal bleeding. Her LMP was 10/2/19. She usually has 30 day cycle and \"never [has] two periods in one month.\" Moreover, her periods are heavy, clotty, and crampy; and this is just light amounts of blood. Recent pregnancy tests at [facility] in [place] (10/20) and at [facility] (10/21) have been negative. She had rough, deeply penetrative sex last night and started noting light vaginal bleeding after that point. She reports no history of STIs. She's been with one male partner for the past year. She denies being sexually active with other men within the past year, and is unsure whether he has been sexually active with other women. She feels safe in her " "relationships and denies ever being physically abused by her current partner. She has her 18 month old daughter with her in the room.     Having bilateral headaches daily. She has headaches at baseline, but these have been worse and she is having some associated visual aura.    Patient has not seen PCP in \"a long time.\"     I have reviewed the Medications, Allergies, Past Medical and Surgical History, and Social History in the Epic system.     ROS: 10 point ROS neg other than the symptoms noted above in the HPI.    Physical Exam   BP: 137/78  Pulse: 95  Temp: 98.7  F (37.1  C)  Resp: 16  Weight: 70.7 kg (155 lb 14.4 oz)  SpO2: 99 %  General: sitting upright, alert and interactive, pleasant, engaged with daughter and boyfriend, no acute distress, not toxic-appearing  Heart: regular rate and rhythm without appreciable murmur, no edema  Lungs: breathing comfortably on room air, CTAB without wheezes or crackles  Abdomen: soft, non-distended, bowel sounds present, TTP of left upper and lower quadrant as well as suprapubic tenderness  Back: bilateral CVA tenderness  : vulva and external genitalia appear normal without lesion or concerning findings; vagina pale but well rugated without obvious lesion, cervix difficult to get in view. Pelvic exam suggests quite posterior, closed, retroverted cervix with palpable polyp. No cervical motion tenderness.  Psych:   Appearance:  awake, alert and dressed in hospital scrubs  Attitude:  evasive  Eye Contact:  good  Mood:  good; with congruent and appropriate affect  Thought Process:  logical, linear and goal oriented  Associations:  no loose associations  Thought Content:  no evidence of suicidal ideation or homicidal ideation  Insight:  good  Judgment:  intact  Oriented to:  time, person, and place  Attention Span and Concentration:  intact  Recent and Remote Memory:  intact    ED Course   Given single dose toradol for pain.     Results for orders placed or performed during the " "hospital encounter of 10/25/19 (from the past 24 hour(s))   HCG qualitative urine (UPT)   Result Value Ref Range    HCG Qual Urine Negative NEG^Negative   UA reflex to Microscopic and Culture   Result Value Ref Range    Color Urine Yellow     Appearance Urine Clear     Glucose Urine Negative NEG^Negative mg/dL    Bilirubin Urine Negative NEG^Negative    Ketones Urine Negative NEG^Negative mg/dL    Specific Gravity Urine 1.016 1.003 - 1.035    Blood Urine Moderate (A) NEG^Negative    pH Urine 7.0 5.0 - 7.0 pH    Protein Albumin Urine 10 (A) NEG^Negative mg/dL    Urobilinogen mg/dL Normal 0.0 - 2.0 mg/dL    Nitrite Urine Negative NEG^Negative    Leukocyte Esterase Urine Negative NEG^Negative    Source Midstream Urine     RBC Urine >182 (H) 0 - 2 /HPF    WBC Urine 1 0 - 5 /HPF    Bacteria Urine Few (A) NEG^Negative /HPF    Squamous Epithelial /HPF Urine 11 (H) 0 - 1 /HPF    Mucous Urine Present (A) NEG^Negative /LPF       Assessments & Plan (with Medical Decision Making)   \"Gladys\" Kushal Palomo is a 19 year old female who presents to the ED with incompletely treated E. coli pyelonephritis requesting prescription to complete treatment, and with concern of vaginal bleeding.     #Pyelonephritis:   As described in HPI, patient has known E coli pyelonephritis and was discharged on 10/24 from [facility] with prescription for ciprofloxacin that she could not fill due to pharmacy being \"out of med.\" We repeated urinalysis here, which looks the same as that at [place]. Reassuringly, CT AP performed 10/21/19 did not show pyelonephritis or hydronephrosis, suggesting any pyelo she does have is likely mild. Moreover, she is afebrile and looks well. Reflexed to culture to confirm presence of ciprofloxacin-sensitive E coli, which is pending. Provided patient with prescription to finish 9 doses ciprofloxacin (500mg BID). Recommended follow-up with PCP within one week to ensure resolution.   - Ciprofloxacin 500mg BID  - Continue " tylenol and ibuprofen PRN for pain  - Follow-up with PCP within one week     #Vaginal bleeding  #Cervical polyp  Vaginal bleeding likely related to friable cervical mucosa and rough penetrative intercourse. Urine HCG negative. Patient negative for STIs in August, 2019. New infection is possible, but wouldn't be resulted anyway until she is discharged from ER. Discussed this with patient and recommended that she get these tests with her PCP instead, which she was amenable to. Also recommend speculum exam with possible pap smear to evaluate what felt like cervical polyp. Attempted speculum exam in ED, but unable to complete due to very retroverted uterus and patient discomfort. Discussed with patient how polyps are typically benign, but concerning factors need be ruled out in outpatient setting. Cervical polyp also may be friable, hence vaginal bleeding. Unlikely to be menstrual bleeding given patient's history of very regular 30 day cycles. Bleeding is likely not uterine or ovarian in origin given completely normal TVUS on 10/23/19 at [facility].     - Follow-up with PCP within one week and schedule annual wellness exam; obtain STI screen and do speculum exam    I have reviewed the nursing notes.    I have reviewed the findings, diagnosis, plan and need for follow up with the patient.    Discharge Medication List as of 10/25/2019 10:44 PM      START taking these medications    Details   ciprofloxacin (CIPRO) 500 MG tablet Take 1 tablet (500 mg) by mouth 2 times daily for 9 doses, Disp-9 tablet, R-0, Local Print             Final diagnoses:   Pyelonephritis   Cervical polyp     Anabella Del Valle MD , PGY-1, [facility] Medicine 11:48 PM October 25, 2019   [facility], [place], EMERGENCY DEPARTMENT    Physician Attestation   I, Shravan Calhoun MD, saw this patient and agree with the findings and plan of care as documented in the note.      Items personally reviewed/procedural attestation: vitals, labs and agree with  the interpretation documented in the note.    MD Lj Massey Gregg A, MD  10/26/19 0002       Anabella Del Valle MD  Resident  10/26/19 0005

## 2019-10-26 NOTE — DISCHARGE INSTRUCTIONS
Please make an appointment to follow up with [facility] (phone: [phone]) within one week, even if you are feeling better. When you call, ask to schedule an annual exam with Dr. Anabella Del Valle.     Today you were seen for vaginal bleeding and pyelonephritis.   During your pelvic exam, I felt a polyp on your cervix, which can be quite common, but I'd like you to follow-up with a primary care provider so this can be visualized with speculum and tested to be sure it's just a normal polyp.     - Take ciprofloxacin 500mg twice daily (once in morning, once at night), starting tomorrow morning (10/26), and ending after your morning dose on 10/30  - Continue taking ibuprofen and tylenol as needed for pain   - Follow-up with me or another primary care provider within one week to make sure your infection is gone, and for annual exam (may need two visits)

## 2019-11-20 ENCOUNTER — HOSPITAL ENCOUNTER (EMERGENCY)
Facility: CLINIC | Age: 19
Discharge: HOME | End: 2019-11-20
Attending: EMERGENCY MEDICINE | Admitting: EMERGENCY MEDICINE
Payer: COMMERCIAL

## 2019-11-20 VITALS
SYSTOLIC BLOOD PRESSURE: 123 MMHG | BODY MASS INDEX: 24.43 KG/M2 | DIASTOLIC BLOOD PRESSURE: 80 MMHG | HEART RATE: 77 BPM | TEMPERATURE: 98.6 F | OXYGEN SATURATION: 100 % | WEIGHT: 156 LBS | RESPIRATION RATE: 16 BRPM

## 2019-11-20 DIAGNOSIS — Z53.20 SURGICAL OR PROCEDURE NOT CARRIED OUT BECAUSE OF PATIENT'S DECISION: ICD-10-CM

## 2019-11-20 DIAGNOSIS — Z13.9 ENCOUNTER FOR MEDICAL SCREENING EXAMINATION: ICD-10-CM

## 2019-11-20 PROCEDURE — 99281 EMR DPT VST MAYX REQ PHY/QHP: CPT | Mod: Z6 | Performed by: EMERGENCY MEDICINE

## 2019-11-20 PROCEDURE — 99281 EMR DPT VST MAYX REQ PHY/QHP: CPT

## 2019-11-20 ASSESSMENT — ENCOUNTER SYMPTOMS
CARDIOVASCULAR NEGATIVE: 1
GASTROINTESTINAL NEGATIVE: 1
RESPIRATORY NEGATIVE: 1
CONSTITUTIONAL NEGATIVE: 1
MUSCULOSKELETAL NEGATIVE: 1

## 2019-11-20 NOTE — ED PROVIDER NOTES
"    [place] EMERGENCY DEPARTMENT ([facility])  19  History     Chief Complaint   Patient presents with     Pregnancy Test     per pt she has been back \" I was just kicked out from [employer], that tell me a lot\" Per pt she just wants confirmation about her pregancy, per pt she has also been drinking, wants to make sure that baby is okay     HPI  Maynor Palomo is a 19 year old female with a past medical history of depressive disorder, and ADHD who is thought to be pregnant who presents to the Emergency Department for a pregnancy test.  She states that she took an at home pregnancy test yesterday which turned out to be positive.  Patient was prompted to take the at home pregnancy test that she was unsure of the last time she had her menstrual period.  She denies that she was having any symptoms such as fevers, chills, dysuria, hematuria, abdominal pain, nausea, vomiting, vaginal bleeding or discharge patient denies the use of birth control as well.  Patient has had 2 previous pregnancies with one spontaneous , and 1 living child.    Past Medical History:   Diagnosis Date     Constipation      Depressive disorder      Migraines        Past Surgical History:   Procedure Laterality Date     BIOPSY LYMPH NODE CERVICAL       LAPAROSCOPIC APPENDECTOMY N/A 2017    Procedure: LAPAROSCOPIC APPENDECTOMY;  Surgeon: Erick Casper MD;  Location: [facility] OR     TONSILLECTOMY, ADENOIDECTOMY WITH SHAVER, COMBINED         Family History   Problem Relation Age of Onset     Depression Mother      Substance Abuse Mother         sober 6 yrs     Depression Father      Mental Illness Father         anger issues     Substance Abuse Father         current user     Depression Maternal Grandmother      Substance Abuse Maternal Grandmother      Mental Illness Paternal Grandmother      Substance Abuse Paternal Grandmother      Mental Illness Paternal Grandfather      Substance Abuse Paternal Grandfather      Mental " Illness Paternal Aunt      Substance Abuse Paternal Aunt      Mental Illness Paternal Uncle      Substance Abuse Paternal Uncle        Social History     Tobacco Use     Smoking status: Current Every Day Smoker     Packs/day: 1.00     Smokeless tobacco: Never Used   Substance Use Topics     Alcohol use: Not Currently     Alcohol/week: 0.0 standard drinks     Comment: last-2 weeks ago       No current facility-administered medications for this encounter.      No current outpatient medications on file.     Facility-Administered Medications Ordered in Other Encounters   Medication     acetaminophen (TYLENOL) tablet 650 mg     ibuprofen (ADVIL,MOTRIN) tablet 400 mg        Allergies   Allergen Reactions     Lactose GI Disturbance     Naproxen      Penicillins        I have reviewed the Medications, Allergies, Past Medical and Surgical History, and Social History in the Epic system.    Review of Systems   Constitutional: Negative.    HENT: Negative.    Respiratory: Negative.    Cardiovascular: Negative.    Gastrointestinal: Negative.    Genitourinary: Negative.    Musculoskeletal: Negative.      ROS: 14 point ROS neg other than the symptoms noted above in the HPI.  Physical Exam   BP: 123/80  Pulse: 77  Temp: 98.6  F (37  C)  Resp: 16  Weight: 70.8 kg (156 lb)  SpO2: 100 %      Physical Exam  Constitutional:       Appearance: Normal appearance.   HENT:      Head: Normocephalic and atraumatic.      Mouth/Throat:      Mouth: Mucous membranes are moist.   Eyes:      Extraocular Movements: Extraocular movements intact.   Neck:      Musculoskeletal: Normal range of motion.   Cardiovascular:      Rate and Rhythm: Normal rate and regular rhythm.   Pulmonary:      Effort: Pulmonary effort is normal.      Breath sounds: Normal breath sounds. No wheezing, rhonchi or rales.   Abdominal:      General: Abdomen is flat. There is no distension.      Palpations: Abdomen is soft. There is no mass.      Tenderness: There is no abdominal  tenderness. There is no guarding or rebound.   Musculoskeletal: Normal range of motion.   Skin:     General: Skin is warm and dry.   Neurological:      General: No focal deficit present.      Mental Status: She is alert and oriented to person, place, and time.         ED Course   4:24 PM  The patient was seen and examined by Dr. Tiburcio Scott in Room ED08.        Procedures             Labs Ordered and Resulted from Time of ED Arrival Up to the Time of Departure from the ED - No data to display         Assessments & Plan (with Medical Decision Making)   Patient presented for a pregnancy test.  She had a home positive pregnancy test yesterday that she took as she could not remember when her last period was.  On my history and exam she denied any physical complaints at this time denies any abdominal or  complaints and was asymptomatic, and abdomen was soft and nontender.  I informed her that she would not be getting an ultrasound as she does not have any symptoms at this time that would warrant an emergent ultrasound and this could be done as an outpatient with OB/GYN when the time would be right.  I will obtain a urine pregnant test to confirm and a UA as she has had history of UTIs to see if she had asymptomatic bacteriuria.  The patient was upset after learning she would not be getting an ultrasound.  She then stated that she was having abdominal pain and that she would refuse to give any urine sample for further testing.  She refused any further testing and would like to leave.  I advised her that I cannot confirm her pregnancy however she is stable and it is appropriate to obtain this as an outpatient.  She was advised to take over-the-counter prenatal vitamins.  She should return if she starts to have symptoms such as severe abdominal pain any fevers or any vaginal bleeding or discharge.  She is discharged in stable condition    I have reviewed the nursing notes.    I have reviewed the findings, diagnosis, plan  and need for follow up with the patient.    New Prescriptions    No medications on file       Final diagnoses:   Encounter for medical screening examination   I, Diego Pierson, am serving as a trained medical scribe to document services personally performed by Tiburcio Scott MD, based on the provider's statements to me.      Tiburcio COYLE MD, was physically present and have reviewed and verified the accuracy of this note documented by Diego Pierson.    11/20/2019   [facility], [place], EMERGENCY DEPARTMENT     Tiburcio Scott MD  11/20/19 5041

## 2019-11-20 NOTE — ED NOTES
"Upon arrival to room pt states, \"I don't understand why they won't do an ultrasound, I want to check and see how far a long I am because of the partying and what not\". Educated on indications for US in ED, discussed outpatient options; discussed request with MD. Pt denies symptoms, \"but it hurt when the doctor pressed on my stomach\". LMP unknown. Encouraged to give urine sample, given apple juice. Unable to produce at this time.   "

## 2019-11-20 NOTE — DISCHARGE INSTRUCTIONS
Please make an appointment to follow up with Your Primary Care Provider as soon as possible.      Follow up with OB/GYN or your primary care physician for a pregnancy test, buy over the counter pre  vitamins to take daily, avoid any drugs or alcohol. Return if you have any vaginal bleeding or discharge or severe abdominal pain, fevers or chills.

## 2019-11-20 NOTE — ED AVS SNAPSHOT
[facility], [place], Emergency Department  [address]  [facility] [address]  Phone:  [phone]  Fax:  [phone]                                    Maynor Palomo   MRN: [id number]    Department:  [facility], Emergency Department   Date of Visit:  11/20/2019           After Visit Summary Signature Page    I have received my discharge instructions, and my questions have been answered. I have discussed any challenges I see with this plan with the nurse or doctor.    ..........................................................................................................................................  Patient/Patient Representative Signature      ..........................................................................................................................................  Patient Representative Print Name and Relationship to Patient    ..................................................               ................................................  Date                                   Time    ..........................................................................................................................................  Reviewed by Signature/Title    ...................................................              ..............................................  Date                                               Time          22EPIC Rev 08/18

## 2020-01-02 ENCOUNTER — HOSPITAL ENCOUNTER (EMERGENCY)
Facility: CLINIC | Age: 20
Discharge: HOME | End: 2020-01-03
Attending: EMERGENCY MEDICINE | Admitting: EMERGENCY MEDICINE
Payer: COMMERCIAL

## 2020-01-02 DIAGNOSIS — Z33.1 PREGNANCY, INCIDENTAL: ICD-10-CM

## 2020-01-02 DIAGNOSIS — N30.00 ACUTE CYSTITIS WITHOUT HEMATURIA: ICD-10-CM

## 2020-01-02 PROCEDURE — 99284 EMERGENCY DEPT VISIT MOD MDM: CPT | Mod: Z6 | Performed by: EMERGENCY MEDICINE

## 2020-01-02 PROCEDURE — 99284 EMERGENCY DEPT VISIT MOD MDM: CPT | Performed by: EMERGENCY MEDICINE

## 2020-01-02 RX ORDER — PRENATAL VIT/IRON FUM/FOLIC AC 27MG-0.8MG
1 TABLET ORAL DAILY
COMMUNITY
End: 2021-09-28

## 2020-01-02 NOTE — LETTER
January 3, 2020      To Whom It May Concern:      Maynor Fatima Dewey was seen in our Emergency Department today, 01/03/20.  Sincerely,        Juhi Mccabe MD

## 2020-01-03 VITALS
DIASTOLIC BLOOD PRESSURE: 53 MMHG | RESPIRATION RATE: 16 BRPM | SYSTOLIC BLOOD PRESSURE: 107 MMHG | TEMPERATURE: 98.7 F | HEART RATE: 83 BPM

## 2020-01-03 LAB
ALBUMIN UR-MCNC: 10 MG/DL
APPEARANCE UR: CLEAR
BACTERIA #/AREA URNS HPF: ABNORMAL /HPF
BILIRUB UR QL STRIP: NEGATIVE
COLOR UR AUTO: YELLOW
GLUCOSE UR STRIP-MCNC: NEGATIVE MG/DL
HGB UR QL STRIP: NEGATIVE
KETONES UR STRIP-MCNC: NEGATIVE MG/DL
LEUKOCYTE ESTERASE UR QL STRIP: ABNORMAL
MUCOUS THREADS #/AREA URNS LPF: PRESENT /LPF
NITRATE UR QL: POSITIVE
PH UR STRIP: 6.5 PH (ref 5–7)
RBC #/AREA URNS AUTO: 2 /HPF (ref 0–2)
SOURCE: ABNORMAL
SP GR UR STRIP: 1.02 (ref 1–1.03)
SQUAMOUS #/AREA URNS AUTO: 4 /HPF (ref 0–1)
UROBILINOGEN UR STRIP-MCNC: NORMAL MG/DL (ref 0–2)
WBC #/AREA URNS AUTO: 43 /HPF (ref 0–5)

## 2020-01-03 PROCEDURE — 87186 SC STD MICRODIL/AGAR DIL: CPT | Performed by: EMERGENCY MEDICINE

## 2020-01-03 PROCEDURE — 25000132 ZZH RX MED GY IP 250 OP 250 PS 637: Performed by: EMERGENCY MEDICINE

## 2020-01-03 PROCEDURE — 87086 URINE CULTURE/COLONY COUNT: CPT | Performed by: EMERGENCY MEDICINE

## 2020-01-03 PROCEDURE — 81001 URINALYSIS AUTO W/SCOPE: CPT | Performed by: EMERGENCY MEDICINE

## 2020-01-03 PROCEDURE — 87088 URINE BACTERIA CULTURE: CPT | Performed by: EMERGENCY MEDICINE

## 2020-01-03 RX ORDER — CEFDINIR 300 MG/1
300 CAPSULE ORAL ONCE
Status: COMPLETED | OUTPATIENT
Start: 2020-01-03 | End: 2020-01-03

## 2020-01-03 RX ORDER — CEFDINIR 300 MG/1
300 CAPSULE ORAL 2 TIMES DAILY
Qty: 14 CAPSULE | Refills: 0 | Status: ON HOLD | OUTPATIENT
Start: 2020-01-03 | End: 2020-05-03

## 2020-01-03 RX ORDER — ACETAMINOPHEN 325 MG/1
650 TABLET ORAL EVERY 4 HOURS PRN
Status: DISCONTINUED | OUTPATIENT
Start: 2020-01-03 | End: 2020-01-03 | Stop reason: HOSPADM

## 2020-01-03 RX ADMIN — CEFDINIR 300 MG: 300 CAPSULE ORAL at 01:47

## 2020-01-03 RX ADMIN — ACETAMINOPHEN 650 MG: 325 TABLET, FILM COATED ORAL at 02:36

## 2020-01-03 ASSESSMENT — ENCOUNTER SYMPTOMS
ABDOMINAL PAIN: 1
DIARRHEA: 0
NAUSEA: 1
CHEST TIGHTNESS: 0
COUGH: 1
FREQUENCY: 1
FEVER: 0
FLANK PAIN: 1
BACK PAIN: 0
VOMITING: 0

## 2020-01-03 NOTE — DISCHARGE INSTRUCTIONS
Use the medication as prescribed. Drink plenty of fluids. Follow up with your OB doctor as planned. Return to the ER with new or worsening symptoms.

## 2020-01-03 NOTE — ED NOTES
Pt states having pressure that is inside her vagina area under her pelvis bone area.  Pt the pain is sharp and constant and worse with any movement.  PT states no vaginal swelling and or discharge.      Pt states she has only done a home preg test and has not had a US.  Pt asked to give a urine and was given some apple juice to help her be able to go.

## 2020-01-03 NOTE — ED PROVIDER NOTES
[facility] EMERGENCY DEPARTMENT ([facility])  1/02/20    History     Chief Complaint   Patient presents with     Abdominal Pain     patient reports lower abdominal pain sharp started today, no vaginal bleeding, 11 weeks pregnant     HPI  Maynor Palomo is a 19 year old female G3, P1, 10-week 5-day pregnant per ultrasound within the last month, Rh+ per chart review, presents to the ER with complaint of low abdominal/pelvic pain for 1 day.  She states yesterday she had some right flank pain for about 45 minutes and then it completely resolved on its own.  She states all day today she has had low abdominal/pelvic pain which she describes as sharp and constant, worse with movement.  She states that she is urinating frequently and she states that the urine is very strong smelling and darker than normal.  She denies any vaginal bleeding, vaginal pain, or abnormal discharge.  No vomiting or diarrhea, but she states that she drinks a lot of water she feels nauseated.  No fevers.  She states she does have a mild cough, think she has a mild cold.  No reported chest discomfort.  She states she has an OB appointment with ultrasound scheduled on the sixth of this month.    Per chart review about a month ago she had issues with pyelonephritis.  She states that that seemed to resolve in the interim.  No back pain today.  No other acute complaints.    This part of the medical record was transcribed by Arturo Schmitt Scribe, from a dictation done by Jhui Mccabe MD.     PAST MEDICAL HISTORY  Past Medical History:   Diagnosis Date     Constipation      Depressive disorder      Migraines      PAST SURGICAL HISTORY  Past Surgical History:   Procedure Laterality Date     BIOPSY LYMPH NODE CERVICAL       LAPAROSCOPIC APPENDECTOMY N/A 1/4/2017    Procedure: LAPAROSCOPIC APPENDECTOMY;  Surgeon: Erick Casper MD;  Location: [facility] OR     TONSILLECTOMY, ADENOIDECTOMY WITH SHAVER, COMBINED       FAMILY  HISTORY  Family History   Problem Relation Age of Onset     Depression Mother      Substance Abuse Mother         sober 6 yrs     Depression Father      Mental Illness Father         anger issues     Substance Abuse Father         current user     Depression Maternal Grandmother      Substance Abuse Maternal Grandmother      Mental Illness Paternal Grandmother      Substance Abuse Paternal Grandmother      Mental Illness Paternal Grandfather      Substance Abuse Paternal Grandfather      Mental Illness Paternal Aunt      Substance Abuse Paternal Aunt      Mental Illness Paternal Uncle      Substance Abuse Paternal Uncle      SOCIAL HISTORY  Social History     Tobacco Use     Smoking status: Current Every Day Smoker     Packs/day: 1.00     Smokeless tobacco: Never Used   Substance Use Topics     Alcohol use: Not Currently     Alcohol/week: 0.0 standard drinks     Comment: last-2 weeks ago     MEDICATIONS  No current facility-administered medications for this encounter.      Current Outpatient Medications   Medication     cefdinir (OMNICEF) 300 MG capsule     Prenatal Vit-Fe Fumarate-FA (PRENATAL MULTIVITAMIN W/IRON) 27-0.8 MG tablet     Facility-Administered Medications Ordered in Other Encounters   Medication     acetaminophen (TYLENOL) tablet 650 mg     ibuprofen (ADVIL,MOTRIN) tablet 400 mg     ALLERGIES  Allergies   Allergen Reactions     Lactose GI Disturbance     Naproxen      Penicillins        I have reviewed the Medications, Allergies, Past Medical and Surgical History, and Social History in the Epic system.    Review of Systems   Constitutional: Negative for fever.   Respiratory: Positive for cough. Negative for chest tightness.    Gastrointestinal: Positive for abdominal pain and nausea. Negative for diarrhea and vomiting.   Genitourinary: Positive for flank pain and frequency. Negative for vaginal bleeding, vaginal discharge and vaginal pain.   Musculoskeletal: Negative for back pain.   All other systems  reviewed and are negative.      Physical Exam   BP: 107/53  Pulse: 83  Temp: 98.7  F (37.1  C)  Resp: 16  SpO2: (SAMUEL pt has fake nails not reading on finger or toe)      Physical Exam  Constitutional:       General: She is not in acute distress.     Appearance: She is not diaphoretic.   HENT:      Head: Atraumatic.      Mouth/Throat:      Pharynx: No oropharyngeal exudate.   Eyes:      General: No scleral icterus.     Pupils: Pupils are equal, round, and reactive to light.   Cardiovascular:      Heart sounds: Normal heart sounds.   Pulmonary:      Effort: No respiratory distress.      Breath sounds: Normal breath sounds.   Abdominal:      General: Bowel sounds are normal.      Palpations: Abdomen is soft.      Tenderness: There is no abdominal tenderness.       Musculoskeletal:         General: No tenderness.   Skin:     General: Skin is warm.      Findings: No rash.         ED Course        Procedures             Critical Care time:  none             Labs Ordered and Resulted from Time of ED Arrival Up to the Time of Departure from the ED   ROUTINE UA WITH MICROSCOPIC REFLEX TO CULTURE - Abnormal; Notable for the following components:       Result Value    Protein Albumin Urine 10 (*)     Nitrite Urine Positive (*)     Leukocyte Esterase Urine Moderate (*)     WBC Urine 43 (*)     Bacteria Urine Few (*)     Squamous Epithelial /HPF Urine 4 (*)     Mucous Urine Present (*)     All other components within normal limits   URINE CULTURE AEROBIC BACTERIAL            Assessments & Plan (with Medical Decision Making)   Patient does have good heart tones.  She is Rh+.  She is had no vaginal bleeding.  She has had ultrasound during this pregnancy which is confirmed IUP.  Urinalysis is consistent with UTI.  She has no back pain today, no fever, and I do not think she has pyelonephritis.  She does report a penicillin allergy, but has tolerated Omnicef in the past.  We did give her her first dose here.  She does have an  appointment in 3 days with OB, has an ultrasound scheduled at that time.  She is strongly encouraged to keep that appointment.  She is encouraged to use the antibiotic as prescribed, and to return immediately with any new or worsening symptoms.  She is not ill-appearing at this time.  Abdominal exam is pretty benign, and I have low suspicion for acute surgical or other serious or acutely life-threatening etiology.    Dictation Disclaimer: Some of this Note has been completed with voice-recognition dictation software. Although errors are generally corrected real-time, there is the potential for a rare error to be present in the completed chart.      I have reviewed the nursing notes.    I have reviewed the findings, diagnosis, plan and need for follow up with the patient.    Discharge Medication List as of 1/3/2020  2:18 AM      START taking these medications    Details   cefdinir (OMNICEF) 300 MG capsule Take 1 capsule (300 mg) by mouth 2 times daily for 7 days, Disp-14 capsule, R-0, Local Print             Final diagnoses:   Acute cystitis without hematuria   Pregnancy, incidental       1/2/2020   [facility], [place], EMERGENCY DEPARTMENT     Juhi Mccabe MD  01/03/20 [id number]

## 2020-01-05 LAB
BACTERIA SPEC CULT: ABNORMAL
Lab: ABNORMAL
SPECIMEN SOURCE: ABNORMAL

## 2020-01-22 ENCOUNTER — HOSPITAL ENCOUNTER (EMERGENCY)
Facility: CLINIC | Age: 20
Discharge: HOME | End: 2020-01-22
Attending: EMERGENCY MEDICINE | Admitting: EMERGENCY MEDICINE
Payer: COMMERCIAL

## 2020-01-22 VITALS
TEMPERATURE: 98.4 F | WEIGHT: 157 LBS | DIASTOLIC BLOOD PRESSURE: 72 MMHG | BODY MASS INDEX: 24.59 KG/M2 | HEART RATE: 92 BPM | SYSTOLIC BLOOD PRESSURE: 114 MMHG | RESPIRATION RATE: 16 BRPM | OXYGEN SATURATION: 99 %

## 2020-01-22 DIAGNOSIS — N39.0 URINARY TRACT INFECTION WITHOUT HEMATURIA, SITE UNSPECIFIED: ICD-10-CM

## 2020-01-22 DIAGNOSIS — O23.42 INFECTION OF URINARY TRACT IN PREGNANCY IN SECOND TRIMESTER: ICD-10-CM

## 2020-01-22 DIAGNOSIS — Z3A.14 14 WEEKS GESTATION OF PREGNANCY: ICD-10-CM

## 2020-01-22 LAB
ALBUMIN UR-MCNC: 30 MG/DL
ANION GAP SERPL CALCULATED.3IONS-SCNC: 8 MMOL/L (ref 3–14)
APPEARANCE UR: ABNORMAL
BACTERIA #/AREA URNS HPF: ABNORMAL /HPF
BASOPHILS # BLD AUTO: 0 10E9/L (ref 0–0.2)
BASOPHILS NFR BLD AUTO: 0 %
BILIRUB UR QL STRIP: NEGATIVE
BUN SERPL-MCNC: 7 MG/DL (ref 7–30)
CALCIUM SERPL-MCNC: 8.8 MG/DL (ref 8.5–10.1)
CHLORIDE SERPL-SCNC: 106 MMOL/L (ref 94–109)
CO2 SERPL-SCNC: 26 MMOL/L (ref 20–32)
COLOR UR AUTO: YELLOW
CREAT SERPL-MCNC: 0.72 MG/DL (ref 0.52–1.04)
DIFFERENTIAL METHOD BLD: NORMAL
EOSINOPHIL # BLD AUTO: 0 10E9/L (ref 0–0.7)
EOSINOPHIL NFR BLD AUTO: 0.1 %
ERYTHROCYTE [DISTWIDTH] IN BLOOD BY AUTOMATED COUNT: 14 % (ref 10–15)
GFR SERPL CREATININE-BSD FRML MDRD: >90 ML/MIN/{1.73_M2}
GLUCOSE BLDC GLUCOMTR-MCNC: 73 MG/DL (ref 70–99)
GLUCOSE SERPL-MCNC: 55 MG/DL (ref 70–99)
GLUCOSE UR STRIP-MCNC: NEGATIVE MG/DL
HCT VFR BLD AUTO: 35.8 % (ref 35–47)
HGB BLD-MCNC: 12.1 G/DL (ref 11.7–15.7)
HGB UR QL STRIP: ABNORMAL
IMM GRANULOCYTES # BLD: 0 10E9/L (ref 0–0.4)
IMM GRANULOCYTES NFR BLD: 0.1 %
KETONES UR STRIP-MCNC: NEGATIVE MG/DL
LEUKOCYTE ESTERASE UR QL STRIP: ABNORMAL
LYMPHOCYTES # BLD AUTO: 1 10E9/L (ref 0.8–5.3)
LYMPHOCYTES NFR BLD AUTO: 12.7 %
MCH RBC QN AUTO: 26.8 PG (ref 26.5–33)
MCHC RBC AUTO-ENTMCNC: 33.8 G/DL (ref 31.5–36.5)
MCV RBC AUTO: 79 FL (ref 78–100)
MONOCYTES # BLD AUTO: 0.4 10E9/L (ref 0–1.3)
MONOCYTES NFR BLD AUTO: 4.8 %
NEUTROPHILS # BLD AUTO: 6.5 10E9/L (ref 1.6–8.3)
NEUTROPHILS NFR BLD AUTO: 82.3 %
NITRATE UR QL: POSITIVE
NRBC # BLD AUTO: 0 10*3/UL
NRBC BLD AUTO-RTO: 0 /100
PH UR STRIP: 5.5 PH (ref 5–7)
PLATELET # BLD AUTO: 177 10E9/L (ref 150–450)
POTASSIUM SERPL-SCNC: 3.4 MMOL/L (ref 3.4–5.3)
RBC # BLD AUTO: 4.51 10E12/L (ref 3.8–5.2)
RBC #/AREA URNS AUTO: 9 /HPF (ref 0–2)
SODIUM SERPL-SCNC: 140 MMOL/L (ref 133–144)
SOURCE: ABNORMAL
SP GR UR STRIP: 1.01 (ref 1–1.03)
SQUAMOUS #/AREA URNS AUTO: 1 /HPF (ref 0–1)
UROBILINOGEN UR STRIP-MCNC: NORMAL MG/DL (ref 0–2)
WBC # BLD AUTO: 8 10E9/L (ref 4–11)
WBC #/AREA URNS AUTO: >182 /HPF (ref 0–5)
WBC CLUMPS #/AREA URNS HPF: PRESENT /HPF

## 2020-01-22 PROCEDURE — 81001 URINALYSIS AUTO W/SCOPE: CPT | Performed by: EMERGENCY MEDICINE

## 2020-01-22 PROCEDURE — 87186 SC STD MICRODIL/AGAR DIL: CPT | Performed by: EMERGENCY MEDICINE

## 2020-01-22 PROCEDURE — 87088 URINE BACTERIA CULTURE: CPT | Performed by: EMERGENCY MEDICINE

## 2020-01-22 PROCEDURE — 99283 EMERGENCY DEPT VISIT LOW MDM: CPT

## 2020-01-22 PROCEDURE — 00000146 ZZHCL STATISTIC GLUCOSE BY METER IP

## 2020-01-22 PROCEDURE — 25000132 ZZH RX MED GY IP 250 OP 250 PS 637: Performed by: EMERGENCY MEDICINE

## 2020-01-22 PROCEDURE — 99284 EMERGENCY DEPT VISIT MOD MDM: CPT | Mod: Z6 | Performed by: EMERGENCY MEDICINE

## 2020-01-22 PROCEDURE — 87086 URINE CULTURE/COLONY COUNT: CPT | Performed by: EMERGENCY MEDICINE

## 2020-01-22 PROCEDURE — 80048 BASIC METABOLIC PNL TOTAL CA: CPT | Performed by: EMERGENCY MEDICINE

## 2020-01-22 PROCEDURE — 85025 COMPLETE CBC W/AUTO DIFF WBC: CPT | Performed by: EMERGENCY MEDICINE

## 2020-01-22 RX ORDER — ACETAMINOPHEN 325 MG/1
650 TABLET ORAL ONCE
Status: COMPLETED | OUTPATIENT
Start: 2020-01-22 | End: 2020-01-22

## 2020-01-22 RX ORDER — ACETAMINOPHEN 500 MG
1000 TABLET ORAL EVERY 6 HOURS PRN
Qty: 60 TABLET | Refills: 0 | Status: ON HOLD | OUTPATIENT
Start: 2020-01-22 | End: 2020-05-03

## 2020-01-22 RX ORDER — CEFDINIR 300 MG/1
300 CAPSULE ORAL 2 TIMES DAILY
Qty: 20 CAPSULE | Refills: 0 | Status: SHIPPED | OUTPATIENT
Start: 2020-01-22 | End: 2021-07-06

## 2020-01-22 RX ORDER — CEFDINIR 300 MG/1
300 CAPSULE ORAL ONCE
Status: COMPLETED | OUTPATIENT
Start: 2020-01-22 | End: 2020-01-22

## 2020-01-22 RX ADMIN — ACETAMINOPHEN 650 MG: 325 TABLET, FILM COATED ORAL at 22:58

## 2020-01-22 RX ADMIN — CEFDINIR 300 MG: 300 CAPSULE ORAL at 22:44

## 2020-01-22 ASSESSMENT — ENCOUNTER SYMPTOMS
CONSTIPATION: 0
HEADACHES: 0
BACK PAIN: 1
CHILLS: 1
PALPITATIONS: 0
ABDOMINAL PAIN: 1
DIARRHEA: 1

## 2020-01-22 NOTE — LETTER
January 22, 2020      To Whom It May Concern:      Maynor Palomo was seen in our Emergency Department today, 01/22/20.  I expect her condition to improve over the next 2 days.  She may return to work/school when improved.    Pt arrived at 6:30pm and leaving at 11pm    Sincerely,        Mckinley Alvarez MD

## 2020-01-22 NOTE — ED AVS SNAPSHOT
[facility], [place], Emergency Department  [address]  [place] [address]  Phone:  [phone]  Fax:  [phone]                                    Maynor Palomo   MRN: [id number]    Department:  [facility], Emergency Department   Date of Visit:  1/22/2020           After Visit Summary Signature Page    I have received my discharge instructions, and my questions have been answered. I have discussed any challenges I see with this plan with the nurse or doctor.    ..........................................................................................................................................  Patient/Patient Representative Signature      ..........................................................................................................................................  Patient Representative Print Name and Relationship to Patient    ..................................................               ................................................  Date                                   Time    ..........................................................................................................................................  Reviewed by Signature/Title    ...................................................              ..............................................  Date                                               Time          22EPIC Rev 08/18

## 2020-01-23 NOTE — RESULT ENCOUNTER NOTE
Emergency Dept/Urgent Care discharge antibiotic (if prescribed): Cefdinir (Omnicef) 300 mg capsule, 1 capsule (300 mg) by mouth 2 times daily for 10 days  Date of Rx (if applicable):  1/22/2020  No changes in treatment per Urine culture protocol.
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
## 2020-01-23 NOTE — ED NOTES
Pt was given two apple juices and a glass of water, she ate a turkey sandwich and drank an apple juice earlier. She said she does not feel like using the bathroom at this time to give urine sample. Plan with pt is to re-check her blood glucose and attempt to use bathroom in 30 minutes.

## 2020-01-23 NOTE — DISCHARGE INSTRUCTIONS
Please make an appointment to follow up with Your Primary Care Provider as soon as possible.

## 2020-01-24 LAB
BACTERIA SPEC CULT: ABNORMAL
Lab: ABNORMAL
SPECIMEN SOURCE: ABNORMAL

## 2020-01-24 NOTE — RESULT ENCOUNTER NOTE
Final Urine Culture Report on 1/24/2020  Emergency Dept/Urgent Care discharge antibiotic prescribed: Cefdinir (Omnicef) 300 mg capsule, 1 capsule (300 mg) by mouth 2 times daily for 10 days  #1. Bacteria, >100,000 colonies/mL Escherichia coli, is SUSCEPTIBLE to Antibiotic.    As per [place] ED Lab Result protocol, no change in antibiotic therapy.

## 2020-05-03 ENCOUNTER — HOSPITAL ENCOUNTER (OUTPATIENT)
Facility: CLINIC | Age: 20
Discharge: HOME | End: 2020-05-03
Attending: OBSTETRICS & GYNECOLOGY | Admitting: OBSTETRICS & GYNECOLOGY
Payer: COMMERCIAL

## 2020-05-03 ENCOUNTER — HOSPITAL ENCOUNTER (OUTPATIENT)
Facility: CLINIC | Age: 20
End: 2020-05-03
Admitting: OBSTETRICS & GYNECOLOGY
Payer: COMMERCIAL

## 2020-05-03 VITALS — RESPIRATION RATE: 18 BRPM | TEMPERATURE: 98.2 F | DIASTOLIC BLOOD PRESSURE: 61 MMHG | SYSTOLIC BLOOD PRESSURE: 107 MMHG

## 2020-05-03 PROBLEM — Z36.89 ENCOUNTER FOR TRIAGE IN PREGNANT PATIENT: Status: ACTIVE | Noted: 2020-05-03

## 2020-05-03 LAB
ALBUMIN UR-MCNC: 10 MG/DL
APPEARANCE UR: CLEAR
BACTERIA #/AREA URNS HPF: ABNORMAL /HPF
BILIRUB UR QL STRIP: NEGATIVE
COLOR UR AUTO: ABNORMAL
GLUCOSE UR STRIP-MCNC: NEGATIVE MG/DL
HGB UR QL STRIP: NEGATIVE
KETONES UR STRIP-MCNC: 5 MG/DL
LEUKOCYTE ESTERASE UR QL STRIP: ABNORMAL
MUCOUS THREADS #/AREA URNS LPF: PRESENT /LPF
NITRATE UR QL: NEGATIVE
PH UR STRIP: 6.5 PH (ref 5–7)
RBC #/AREA URNS AUTO: 1 /HPF (ref 0–2)
SOURCE: ABNORMAL
SP GR UR STRIP: 1.03 (ref 1–1.03)
SPECIMEN SOURCE: ABNORMAL
SQUAMOUS #/AREA URNS AUTO: 3 /HPF (ref 0–1)
UROBILINOGEN UR STRIP-MCNC: NORMAL MG/DL (ref 0–2)
WBC #/AREA URNS AUTO: 5 /HPF (ref 0–5)
WET PREP SPEC: ABNORMAL

## 2020-05-03 PROCEDURE — 59025 FETAL NON-STRESS TEST: CPT

## 2020-05-03 PROCEDURE — 40000268 ZZH STATISTIC NO CHARGES: Performed by: EMERGENCY MEDICINE

## 2020-05-03 PROCEDURE — G0463 HOSPITAL OUTPT CLINIC VISIT: HCPCS | Mod: 25

## 2020-05-03 PROCEDURE — 87210 SMEAR WET MOUNT SALINE/INK: CPT | Performed by: STUDENT IN AN ORGANIZED HEALTH CARE EDUCATION/TRAINING PROGRAM

## 2020-05-03 PROCEDURE — 81001 URINALYSIS AUTO W/SCOPE: CPT | Performed by: STUDENT IN AN ORGANIZED HEALTH CARE EDUCATION/TRAINING PROGRAM

## 2020-05-03 NOTE — PROGRESS NOTES
Data: [facility]11.   Reason for maternal/fetal assessment per patient is Vaginal Bleeding (Pt is 28 weeks pregnant with vaginal bleeding)  . Patient is a . Prenatal record reviewed. Gestational Age 28w6d. VSS. Cervix: closed.  Fetal movement present. Patient denies cramping, backache, pelvic pressure, UTI symptoms, GI problems, edema, headache, visual disturbances, epigastric or URQ pain, abdominal pain, rupture of membranes. Support persons boyfriend present.  Action: Verbal consent for EFM. Triage assessment completed. EFM applied for NST (reactive- see flowsheets). Fetal assessment: Presumed adequate fetal oxygenation documented (see flow record). Patient education pamphlets given on fetal movement counts, smoking during pregnancy, bladder infections, and bleeding during pregnancy. Patient instructed to report change in fetal movement, vaginal leaking of fluid or bleeding, abdominal pain, or any concerns related to the pregnancy to her nurse/physician.   Response: Dr. Kizzy Durand informed of pt arrival. Plan per provider is to discharge pt home. Patient verbalized understanding of education and verbalized agreement with plan. Discharged ambulatory at 0515.

## 2020-05-03 NOTE — ED NOTES
Pt arrived to ED with complaint of vaginal bleeding .  Pt reports 28 weeks pregnant.   Pt is having contractions No.   Pt feels urge to push No.   Pt reports water broke No.   Report was called and pt was transferred to L&D Yes. Spoke to carmelita Salazar.  Pt's temp 98.5, 99% O2.  COVID screen negative as well as partners.

## 2020-05-03 NOTE — PLAN OF CARE
Pt states that she had a large amount of vaginal bleeding after sex.  Pt had a light amount of blood on her pad.

## 2020-05-03 NOTE — PROVIDER NOTIFICATION
"   05/03/20 0515   Provider Notification   Provider Name/Title Dr. Durand   Method of Notification In Department   Pt reported eating multiple spoonfuls of cornstarch each day prior during her discharge paper work overview. Pt stated this craving started at 24 weeks and \"the thought of it makes her mouth water\" to the point where she consumes it. She expressed that she was embarrassed to tell her OB. I encouraged her to notify her provider at her next visit. Dr. Durand made a note in her chart.   "

## 2020-05-03 NOTE — PROGRESS NOTES
[facility]  OB History and Physical      Name: Maynor Palomo MRN#: [id number]   Age: 19 year old YOB: 2000      HPI:   Maynor Palomo is a 19 year old  at 28w6d by 6w1d US, who presents with bleeding after vaginal intercourse.    Denied pain during intercourse, but noted small gush of blood after intercourse. This is the first episode of bleeding in this pregnancy. Denies change in vaginal discharge/odor, gush of fluid like rupture of membrane, vaginal pain, vaginal itchiness, or vaginal pressure.     Denies dysuria, change in urinary frequency, change in urinary urgency, or hematuria. Denied constipation, diarrhea, or blood in stool. Denies headache, vision changes, chest pain, SOB, epigastric/RUQ pain, acute worsening in bilateral lower extremities swelling. Notes normal fetal movement.     Patient endorsed to RN that she also eats multiple spoonful of corn starch daily.    ROS:   Complete 10-point ROS negative except as noted in HPI.    Pregnancy Complications:  Anxiety/depression/ADHD/PTSD  H/o pyelonephritis, UCx >100K e coli, on suppression abx  Rubella non-immune, varicella non-immune  Polysubstance use (UDS 10/23/2019 showed benzodiazepines, THC, opiates, but UDS 2020 neg), including tobacco    Prenatal Labs:   T&S: A positive, antibody negative  Hgb: 12.1 on 2020  Plt: 177 on 2020  GCT: not yet performed   GBS: not yet performed   Rubella: non-immune 2020  HIV/HepB/RPR: neg 2020  GC/Chlam: neg 2020    Ultrasounds  3/6/2020 at 20w3d: normal anatomy scan. The placenta is posterior and is without evidence of previa.         Past Medical History:   Denied personal h/o medical conditions          Past Surgical History:       BIOPSY LYMPH NODE CERVICAL       LAPAROSCOPIC APPENDECTOMY N/A 2017    Procedure: LAPAROSCOPIC APPENDECTOMY;  Surgeon: Erick Casper MD;  Location: [facility] OR     TONSILLECTOMY, ADENOIDECTOMY WITH  SHAVER, COMBINED            Social History:   2 cigarettes daily. Denied alcohol or other recreational drug use.          Family History:   Denied any family h/o pregnancy complications         Allergies:       Penicillins           Medications:     Medication Sig Dispense Refill Last Dose     cefdinir (OMNICEF) 300 MG capsule Take 1 capsule (300 mg) by mouth 2 times daily 20 capsule 0 2020 at Unknown time     Prenatal Vit-Fe Fumarate-FA (PRENATAL MULTIVITAMIN W/IRON) 27-0.8 MG tablet Take 1 tablet by mouth daily   2020 at Unknown time     PE:  Vit:   Patient Vitals for the past 4 hrs:   BP Temp Temp src Resp   20 0325 107/61 98.2  F (36.8  C) Oral 18      Gen: Well-appearing, NAD, comfortable   CV: Well perfused  Pulm: Normal respiratory efforts  Abd: Soft, gravid, non-tender  Ext: trace LE edema b/l  Cx: Closed/long/high/mid position/soft texture  SVE: no dry blood at the introitus. No vaginal laceration. No ectropion on cervix. There is 1 streak of mucousy blood from cervical os, but no active bleeding. Cervix is not friable. No other lesions noted at cervix. Cervix appeared closed.    NST:  FHT: Baseline 130, moderate variability, accelerations present, no decelerations   [place]: 0 contractions in 10 minutes           Labs/Imagings:   Wet prep: pending  UA: pending     Assessment and Plan:   Maynor Palomo is a 19 year old  at 28w6d by 6w1d US, who presents with bleeding after vaginal intercourse.    Vaginal bleeding   - ddx for 2nd trimester vaginal bleeding include ectropion, vaginal trauma, vaginal infection, cervical insufficiency, labor, cervicitis, placenta previa, vasa previa, and other.  The most likely diagnosis in this patient is vaginal trauma.  No ectropion was visualized at patient cervix.  Patient does not appear to be in labor as she did not have any subjective contractions or abdominal pain or lower back pain, and her cervix is close long.  No evidence of cervicitis or  vaginal infections noted.  Wet prep was collected to rule out yeast infection and bacterial vaginosis.  GC/chlam was negative on  and patient had no concerns for STI.  Thus, it was not recollected today.  Patient is only 19 years old without immunocompromisation and thus no cervical cancer screening has been performed yet.  - labor precautions were given to patient, including worsening abdominal pain, back pain, bleeding from more than 1 pad per hour for 2 hours, gush of fluid like her water broke, and more.  I welcome patient to our facility, but I also encouraged patient to return to the same facility that she intends to deliver at to receive consistent and high-quality care.  Patient said that she has been coming to [facility] since her childhood, but intend to [facility]  - Can call patient at [phone] about UA and wet prep result.     FWB:   - Appropriate for gestational age.    PICA  - recommended that patient should get her hgb check with her primary OB because eating/craving corn starch could be a sign of anemia.    Patient discussed with Dr. Rojas.  Kizzy Durand MD ([email or web address])  [facility] OBGYN PGY-2  Personal pager: [phone]  5/3/2020 4:47 AM           Physician Attestation   I, Isabella Rojas MD, discussed this patient with the resident and agree with the resident/fellow's findings and plan of care as documented in the note.      I personally reviewed vital signs, labs and fetal heart rate tracing.    Key findings: Reactive NST.     Isabella Rojas MD  Date of Service: 20

## 2020-05-03 NOTE — DISCHARGE INSTRUCTIONS
Discharge Instruction for Undelivered Patients      You were seen for: Vaginal bleeding  We Consulted: Dr. Kizzy Durand  You had (Test or Medicine): an NST    Diet:   Drink 8 to 12 glasses of liquids (milk, juice, water) every day.  You may eat meals and snacks.     Activity:  No vaginal intercourse for 1 week.     Call your provider if you notice:  Swelling in your face or increased swelling in your hands or legs.  Headaches that are not relieved by Tylenol (acetaminophen).  Changes in your vision (blurring: seeing spots or stars.)  Nausea (sick to your stomach) and vomiting (throwing up).   Weight gain of 5 pounds or more per week.  Heartburn that doesn't go away.  Signs of bladder infection: pain when you urinate (use the toilet), need to go more often and more urgently.  The bag of parra (rupture of membranes) breaks, or you notice leaking in your underwear.  Bright red blood in your underwear.  Abdominal (lower belly) or stomach pain.  For first baby: Contractions (tightening) less than 5 minutes apart for one hour or more.  Second (plus) baby: Contractions (tightening) less than 10 minutes apart and getting stronger.  *If less than 34 weeks: Contractions (tightenings) more than 6 times in one hour.  Increase or change in vaginal discharge (note the color and amount)  Other: ***    Follow-up:  As scheduled in the clinic

## 2020-05-26 ENCOUNTER — HOSPITAL ENCOUNTER (OUTPATIENT)
Facility: CLINIC | Age: 20
Discharge: HOME | End: 2020-05-26
Attending: OBSTETRICS & GYNECOLOGY | Admitting: OBSTETRICS & GYNECOLOGY
Payer: COMMERCIAL

## 2020-05-26 VITALS
BODY MASS INDEX: 27.57 KG/M2 | RESPIRATION RATE: 16 BRPM | WEIGHT: 176 LBS | OXYGEN SATURATION: 98 % | TEMPERATURE: 98.9 F | HEART RATE: 100 BPM

## 2020-05-26 DIAGNOSIS — Z36.89 ENCOUNTER FOR TRIAGE IN PREGNANT PATIENT: Primary | ICD-10-CM

## 2020-05-26 LAB
ALBUMIN UR-MCNC: NEGATIVE MG/DL
APPEARANCE UR: CLEAR
BILIRUB UR QL STRIP: NEGATIVE
COLOR UR AUTO: YELLOW
GLUCOSE UR STRIP-MCNC: NEGATIVE MG/DL
HGB UR QL STRIP: NEGATIVE
KETONES UR STRIP-MCNC: NEGATIVE MG/DL
LEUKOCYTE ESTERASE UR QL STRIP: NEGATIVE
MUCOUS THREADS #/AREA URNS LPF: PRESENT /LPF
NITRATE UR QL: NEGATIVE
PH UR STRIP: 6.5 PH (ref 5–7)
RBC #/AREA URNS AUTO: 2 /HPF (ref 0–2)
SOURCE: ABNORMAL
SP GR UR STRIP: 1.01 (ref 1–1.03)
SQUAMOUS #/AREA URNS AUTO: 3 /HPF (ref 0–1)
UROBILINOGEN UR STRIP-MCNC: NORMAL MG/DL (ref 0–2)
WBC #/AREA URNS AUTO: 1 /HPF (ref 0–5)

## 2020-05-26 PROCEDURE — 99213 OFFICE O/P EST LOW 20 MIN: CPT | Mod: 25 | Performed by: OBSTETRICS & GYNECOLOGY

## 2020-05-26 PROCEDURE — 59025 FETAL NON-STRESS TEST: CPT | Mod: 26 | Performed by: OBSTETRICS & GYNECOLOGY

## 2020-05-26 PROCEDURE — 25000132 ZZH RX MED GY IP 250 OP 250 PS 637: Performed by: STUDENT IN AN ORGANIZED HEALTH CARE EDUCATION/TRAINING PROGRAM

## 2020-05-26 PROCEDURE — G0463 HOSPITAL OUTPT CLINIC VISIT: HCPCS

## 2020-05-26 PROCEDURE — 81001 URINALYSIS AUTO W/SCOPE: CPT | Performed by: OBSTETRICS & GYNECOLOGY

## 2020-05-26 PROCEDURE — 40000268 ZZH STATISTIC NO CHARGES: Performed by: EMERGENCY MEDICINE

## 2020-05-26 RX ORDER — NITROFURANTOIN 25; 75 MG/1; MG/1
100 CAPSULE ORAL DAILY
COMMUNITY
End: 2021-09-28

## 2020-05-26 RX ORDER — HYDROXYZINE HYDROCHLORIDE 50 MG/1
50 TABLET, FILM COATED ORAL ONCE
Status: COMPLETED | OUTPATIENT
Start: 2020-05-26 | End: 2020-05-26

## 2020-05-26 RX ORDER — HYDROXYZINE HYDROCHLORIDE 25 MG/1
25-50 TABLET, FILM COATED ORAL
Qty: 20 TABLET | Refills: 0 | Status: SHIPPED | OUTPATIENT
Start: 2020-05-26 | End: 2021-07-06

## 2020-05-26 RX ORDER — ACETAMINOPHEN 325 MG/1
325-650 TABLET ORAL EVERY 6 HOURS PRN
COMMUNITY
End: 2021-07-06

## 2020-05-26 RX ADMIN — HYDROXYZINE HYDROCHLORIDE 50 MG: 50 TABLET, FILM COATED ORAL at 22:37

## 2020-05-27 NOTE — DISCHARGE INSTRUCTIONS
Discharge Instruction for Undelivered Patients      You were seen for: Abdominal Pain  We Consulted: Dr Cuevas  You had (Test or Medicine):fetal monitoring, urinalysis     Diet:   Drink 8 to 12 glasses of liquids (milk, juice, water) every day.  You may eat meals and snacks.     Activity:  Call your doctor or nurse midwife if your baby is moving less than usual.     Call your provider if you notice:  Swelling in your face or increased swelling in your hands or legs.  Headaches that are not relieved by Tylenol (acetaminophen).  Changes in your vision (blurring: seeing spots or stars.)  Nausea (sick to your stomach) and vomiting (throwing up).   Weight gain of 5 pounds or more per week.  Heartburn that doesn't go away.  Signs of bladder infection: pain when you urinate (use the toilet), need to go more often and more urgently.  The bag of parra (rupture of membranes) breaks, or you notice leaking in your underwear.  Bright red blood in your underwear.  Abdominal (lower belly) or stomach pain.  For first baby: Contractions (tightening) less than 5 minutes apart for one hour or more.  Second (plus) baby: Contractions (tightening) less than 10 minutes apart and getting stronger.  *If less than 34 weeks: Contractions (tightenings) more than 6 times in one hour.  Increase or change in vaginal discharge (note the color and amount)      Follow-up:  As scheduled in the clinic

## 2020-05-27 NOTE — PLAN OF CARE
"Data: [facility] at 2140.   Reason for maternal/fetal assessment per patient is Abdominal Pain (ain with fetal movement and \"tightening\" started about 1400)  . Patient is a . Prenatal record reviewed.      OB History    Para Term  AB Living   3 1 1 0 1 1   SAB TAB Ectopic Multiple Live Births   1 0 0 0 1      # Outcome Date GA Lbr Helio/2nd Weight Sex Delivery Anes PTL Lv   3 Current            2 SAB 2019           1 Term 18 39w6d   F  EPI  [place]      Name: BG RODO BLOOM      Apgar1: 8  Apgar5: 9      Medical History:   Past Medical History:   Diagnosis Date     Constipation      Depressive disorder      Migraines    . Gestational Age 32w1d. VSS. Cervix: not examined.  Fetal movement present. Patient denies cramping, backache, vaginal discharge, pelvic pressure, UTI symptoms, GI problems, bloody show, vaginal bleeding, edema, headache, visual disturbances, epigastric or URQ pain, abdominal pain, rupture of membranes. Support persons Kunal present.  Action: Verbal consent for EFM. Triage assessment completed. Fetal assessment: Presumed adequate fetal oxygenation documented (see flow record). Patient education pamphlets given on fetal movement counts and Honoring Choices. Patient instructed to report change in fetal movement, vaginal leaking of fluid or bleeding, abdominal pain, or any concerns related to the pregnancy to her nurse/physician.   Response: Dr. Cuevas informed of arrival. Plan per provider is discharge with vistaril. Patient verbalized understanding of education and verbalized agreement with plan. Discharged ambulatory at 2245.      "

## 2020-05-27 NOTE — PROGRESS NOTES
"Obstetrics Triage Note    CC: abdominal discomfort    HPI:  Maynor Palomo is a 19 year old  female at 32w1d by 6w1d US here with abdominal discomfort with fetal movement. Reports her baby is very active and since \"she's already almost 9 months in July, ready for baby to come.\" Reports some tightenings that can be strong but then they go away. No regular/strong ctx. Denies LOF, VB. No dysuria, vaginal discharge/irritation.     Pregnancy Complications:  Anxiety/depression/ADHD/PTSD  H/o pyelonephritis, UCx >100K e coli, on suppression abx  Rubella non-immune, varicella non-immune  Polysubstance use (UDS 10/23/2019 showed benzodiazepines, THC, opiates, but UDS 2020 neg), including tobacco     Prenatal Labs:   T&S: A positive, antibody negative  Hgb: 12.1 on 2020  Plt: 177 on 2020  GCT: not yet performed   GBS: not yet performed   Rubella: non-immune 2020  HIV/HepB/RPR: neg 2020  GC/Chlam: neg 2020     Ultrasounds  3/6/2020 at 20w3d: normal anatomy scan. The placenta is posterior and is without evidence of previa.    ROS:  Negative except as mentioned in HPI.    PMH:  Past Medical History:   Diagnosis Date     Constipation      Depressive disorder      Migraines        PSHx:  Past Surgical History:   Procedure Laterality Date     BIOPSY LYMPH NODE CERVICAL       LAPAROSCOPIC APPENDECTOMY N/A 2017    Procedure: LAPAROSCOPIC APPENDECTOMY;  Surgeon: Erick Casper MD;  Location: [facility] OR     TONSILLECTOMY, ADENOIDECTOMY WITH SHAVER, COMBINED         Medications:  No current facility-administered medications for this encounter.      Facility-Administered Medications Ordered in Other Encounters   Medication     acetaminophen (TYLENOL) tablet 650 mg     ibuprofen (ADVIL,MOTRIN) tablet 400 mg       Allergies:     Allergies   Allergen Reactions     Lactose GI Disturbance     Naproxen      Penicillins        Physical Exam:   Vitals:    20 2124 20 2145   Pulse: 100  "   Resp: 16    Temp: 97.4  F (36.3  C) 98.9  F (37.2  C)   TempSrc: Oral Oral   SpO2: 98%    Weight: 79.8 kg (176 lb)       Gen: resting comfortably, in NAD  CV: reg rate  Pulm: no increased work of breathing  Abd: soft, gravid, non-tender to palpation but reports discomfort with audible/palpable fetal movement  Cx: deferred     NST:  FHT: , mod dann, + accels, no decels  [place]: quiet    Labs:  UA negative     Assessment/Plan: 19 year old female  at 32w1d by 6w1d US, here for abdominal discomfort. Likely MSK discomfort exacerbated by active FM. Improved in triage and requesting discharge now with medication to help her sleep.     Cat I FHT, reactive, and quiet tocometry.   SVE deferred given no ctx on monitor or palpable by patient, very low concern for  labor.   Vistaril given in triage, rx sent to pharmacy in [facility].   UA collected for hx pyelonephritis on prophylaxis and negative.    Encouraged to f/u with primary OB and present to [facility]     - Dispo: to home  - Discussed labor warning signs and indication to return to care.    Kaye Cuevas MD PGY3  Department of OB/GYN  2020 10:10 PM       Physician Attestation   I, Isabella Rojas MD, saw this patient with the resident and agree with the resident/fellow's findings and plan of care as documented in the note.      I personally reviewed vital signs, medications, labs and NST.    Key findings: NST reactive, not in labor, feeling improved.     Isabella Rojas MD  Date of Service (when I saw the patient): 20

## 2020-05-27 NOTE — ED NOTES
"Pt arrived to ED with complaint of \"tightening in my belly\" .  Pt reports 32 weeks pregnant.   Pt is having contractions No.   Pt feels urge to push No.   Pt reports water broke No.   Report was called and pt was transferred to L&D Yes Rishi HOLT called and updated.   "

## 2021-07-06 ENCOUNTER — APPOINTMENT (OUTPATIENT)
Dept: GENERAL RADIOLOGY | Facility: CLINIC | Age: 21
End: 2021-07-06
Attending: EMERGENCY MEDICINE
Payer: COMMERCIAL

## 2021-07-06 ENCOUNTER — HOSPITAL ENCOUNTER (EMERGENCY)
Facility: CLINIC | Age: 21
Discharge: HOME | End: 2021-07-06
Attending: EMERGENCY MEDICINE | Admitting: EMERGENCY MEDICINE
Payer: COMMERCIAL

## 2021-07-06 VITALS
HEART RATE: 83 BPM | RESPIRATION RATE: 18 BRPM | OXYGEN SATURATION: 100 % | DIASTOLIC BLOOD PRESSURE: 63 MMHG | SYSTOLIC BLOOD PRESSURE: 109 MMHG | TEMPERATURE: 98.3 F

## 2021-07-06 DIAGNOSIS — U07.1 INFECTION DUE TO 2019 NOVEL CORONAVIRUS: ICD-10-CM

## 2021-07-06 DIAGNOSIS — R05.9 COUGH: ICD-10-CM

## 2021-07-06 LAB
FLUAV RNA RESP QL NAA+PROBE: NEGATIVE
FLUBV RNA RESP QL NAA+PROBE: NEGATIVE
HCG UR QL: NEGATIVE
INTERNAL QC OK POCT: YES
LABORATORY COMMENT REPORT: NORMAL
RSV RNA SPEC NAA+PROBE: NEGATIVE
SARS-COV-2 RNA RESP QL NAA+PROBE: POSITIVE
SPECIMEN SOURCE: NORMAL
SPECIMEN SOURCE: NORMAL

## 2021-07-06 PROCEDURE — 250N000013 HC RX MED GY IP 250 OP 250 PS 637: Performed by: EMERGENCY MEDICINE

## 2021-07-06 PROCEDURE — C9803 HOPD COVID-19 SPEC COLLECT: HCPCS | Performed by: EMERGENCY MEDICINE

## 2021-07-06 PROCEDURE — 81025 URINE PREGNANCY TEST: CPT | Performed by: EMERGENCY MEDICINE

## 2021-07-06 PROCEDURE — 99284 EMERGENCY DEPT VISIT MOD MDM: CPT | Mod: 25 | Performed by: EMERGENCY MEDICINE

## 2021-07-06 PROCEDURE — 71045 X-RAY EXAM CHEST 1 VIEW: CPT

## 2021-07-06 PROCEDURE — 99284 EMERGENCY DEPT VISIT MOD MDM: CPT | Performed by: EMERGENCY MEDICINE

## 2021-07-06 PROCEDURE — 87631 RESP VIRUS 3-5 TARGETS: CPT | Performed by: EMERGENCY MEDICINE

## 2021-07-06 RX ORDER — CODEINE PHOSPHATE AND GUAIFENESIN 10; 100 MG/5ML; MG/5ML
5 SOLUTION ORAL ONCE
Status: COMPLETED | OUTPATIENT
Start: 2021-07-06 | End: 2021-07-06

## 2021-07-06 RX ORDER — LORAZEPAM 1 MG/1
1 TABLET ORAL ONCE
Status: COMPLETED | OUTPATIENT
Start: 2021-07-06 | End: 2021-07-06

## 2021-07-06 RX ORDER — ACETAMINOPHEN 325 MG/1
325-650 TABLET ORAL EVERY 6 HOURS PRN
Qty: 30 TABLET | Refills: 0 | Status: ON HOLD | OUTPATIENT
Start: 2021-07-06 | End: 2021-09-07

## 2021-07-06 RX ORDER — ACETAMINOPHEN 500 MG
1000 TABLET ORAL ONCE
Status: COMPLETED | OUTPATIENT
Start: 2021-07-06 | End: 2021-07-06

## 2021-07-06 RX ORDER — IBUPROFEN 600 MG/1
600 TABLET, FILM COATED ORAL ONCE
Status: COMPLETED | OUTPATIENT
Start: 2021-07-06 | End: 2021-07-06

## 2021-07-06 RX ORDER — GUAIFENESIN/DEXTROMETHORPHAN 100-10MG/5
5 SYRUP ORAL 4 TIMES DAILY PRN
Qty: 118 ML | Refills: 0 | Status: SHIPPED | OUTPATIENT
Start: 2021-07-06 | End: 2021-09-28

## 2021-07-06 RX ADMIN — ACETAMINOPHEN 1000 MG: 500 TABLET, FILM COATED ORAL at 07:35

## 2021-07-06 RX ADMIN — GUAIFENESIN AND CODEINE PHOSPHATE 5 ML: 10; 100 LIQUID ORAL at 09:21

## 2021-07-06 RX ADMIN — LORAZEPAM 1 MG: 1 TABLET ORAL at 09:07

## 2021-07-06 RX ADMIN — IBUPROFEN 600 MG: 600 TABLET, FILM COATED ORAL at 09:07

## 2021-07-06 ASSESSMENT — ENCOUNTER SYMPTOMS
FEVER: 0
ABDOMINAL PAIN: 0
ARTHRALGIAS: 0
DIFFICULTY URINATING: 0
COLOR CHANGE: 0
COUGH: 1
SHORTNESS OF BREATH: 1
RHINORRHEA: 1
EYE REDNESS: 0
CONFUSION: 0
HEADACHES: 1
NECK STIFFNESS: 0

## 2021-07-06 NOTE — ED PROVIDER NOTES
ED Provider Note  [facility]      History     Chief Complaint   Patient presents with     Headache     Cough     Shortness of Breath     HPI  Maynor Palomo is a 21 year old female with a history of PTSD, suicidal ideation, appendicitis who presents with a 3-day history of URI type symptoms including cough, congestion, headache and shortness of breath.  As well, patient has mild chest pain associated with coughing.  She notes that she is coughing so hard that she is vomiting.  She was seen in clinic yesterday and according to her, diagnosed with Covid.  However, patient was also seen in the emergency room yesterday evening for similar symptoms at which time work-up including CBC and other laboratory tests were unremarkable.  According to emergency room notes from [facility], patient became upset when asked to change into a gown for a chest x-ray and then eloped from the emergency room.    Past Medical History  Past Medical History:   Diagnosis Date     Constipation      Depressive disorder      Migraines      Past Surgical History:   Procedure Laterality Date     BIOPSY LYMPH NODE CERVICAL       LAPAROSCOPIC APPENDECTOMY N/A 1/4/2017    Procedure: LAPAROSCOPIC APPENDECTOMY;  Surgeon: Erick Casper MD;  Location: [facility] OR     TONSILLECTOMY, ADENOIDECTOMY WITH SHAVER, COMBINED       acetaminophen (TYLENOL) 325 MG tablet  guaiFENesin-dextromethorphan (ROBITUSSIN DM) 100-10 MG/5ML syrup  nitroFURantoin macrocrystal-monohydrate (MACROBID) 100 MG capsule  Prenatal Vit-Fe Fumarate-FA (PRENATAL MULTIVITAMIN W/IRON) 27-0.8 MG tablet      Allergies   Allergen Reactions     Lactose GI Disturbance     Naproxen      Penicillins      Family History  Family History   Problem Relation Age of Onset     Depression Mother      Substance Abuse Mother         sober 6 yrs     Depression Father      Mental Illness Father         anger issues     Substance Abuse Father         current user      Depression Maternal Grandmother      Substance Abuse Maternal Grandmother      Mental Illness Paternal Grandmother      Substance Abuse Paternal Grandmother      Mental Illness Paternal Grandfather      Substance Abuse Paternal Grandfather      Mental Illness Paternal Aunt      Substance Abuse Paternal Aunt      Mental Illness Paternal Uncle      Substance Abuse Paternal Uncle      Social History   Social History     Tobacco Use     Smoking status: Current Some Day Smoker     Packs/day: 1.00     Types: Cigarettes     Last attempt to quit: 5/15/2020     Years since quittin.1     Smokeless tobacco: Never Used   Substance Use Topics     Alcohol use: Not Currently     Alcohol/week: 0.0 standard drinks     Comment: last-2 weeks ago     Drug use: Not Currently     Types: Marijuana     Comment: THC-last 2 weeks ago      Past medical history, past surgical history, medications, allergies, family history, and social history were reviewed with the patient. No additional pertinent items.       Review of Systems   Constitutional: Negative for fever.   HENT: Positive for congestion and rhinorrhea.    Eyes: Negative for redness.   Respiratory: Positive for cough and shortness of breath.    Cardiovascular: Negative for chest pain.   Gastrointestinal: Negative for abdominal pain.   Genitourinary: Negative for difficulty urinating.   Musculoskeletal: Negative for arthralgias and neck stiffness.   Skin: Negative for color change.   Neurological: Positive for headaches.   Psychiatric/Behavioral: Negative for confusion.     A complete review of systems was performed with pertinent positives and negatives noted in the HPI, and all other systems negative.    Physical Exam   BP: 116/85  Pulse: 117  Temp: 98.1  F (36.7  C)  Resp: 16  SpO2: 100 %  Physical Exam  Constitutional:       General: She is not in acute distress.     Appearance: She is not diaphoretic.   HENT:      Head: Atraumatic.      Nose: Rhinorrhea present.       Mouth/Throat:      Pharynx: No oropharyngeal exudate.   Eyes:      General: No scleral icterus.     Pupils: Pupils are equal, round, and reactive to light.   Cardiovascular:      Heart sounds: Normal heart sounds.   Pulmonary:      Effort: No respiratory distress.      Breath sounds: Normal breath sounds.   Abdominal:      General: Bowel sounds are normal.      Palpations: Abdomen is soft.      Tenderness: There is no abdominal tenderness.   Musculoskeletal:         General: No tenderness.   Skin:     General: Skin is warm.      Findings: No rash.         ED Course      Procedures        The medical record was reviewed and interpreted.  Current labs reviewed and interpreted.  Previous labs reviewed and interpreted.       Results for orders placed or performed during the hospital encounter of 07/06/21   XR Chest Port 1 View     Status: None    Narrative    CHEST PORTABLE ONE VIEW  7/6/2021 7:59 AM     HISTORY: Dyspnea, probably COVID positive.    COMPARISON: None.      Impression    IMPRESSION: Heart size is normal. No pleural effusion, pneumothorax,  or abnormal area of consolidation.    EAN HOLLEY MD         SYSTEM ID:  [id number]   Symptomatic SARS-CoV-2 COVID-19 Virus (Coronavirus) by PCR     Status: None    Specimen: Nasopharyngeal   Result Value Ref Range    SARS-CoV-2 Virus Specimen Source Nasopharyngeal     SARS-CoV-2 PCR Result Positive     SARS-CoV-2 PCR Comment (Note)    hCG qual urine POCT     Status: Normal   Result Value Ref Range    HCG Qual Urine Negative neg    Internal QC OK Yes    Influenza A and B and RSV PCR     Status: None   Result Value Ref Range    Specimen Description Nasopharyngeal     Influenza A PCR Negative NEG^Negative    Influenza B PCR Negative NEG^Negative    Resp Syncytial Virus Negative NEG^Negative     Medications   acetaminophen (TYLENOL) tablet 1,000 mg (1,000 mg Oral Given 7/6/21 4981)   guaiFENesin-codeine (ROBITUSSIN AC) 100-10 MG/5ML solution 5 mL (5 mLs Oral Given 7/6/21  0921)   LORazepam (ATIVAN) tablet 1 mg (1 mg Oral Given 7/6/21 0907)   ibuprofen (ADVIL/MOTRIN) tablet 600 mg (600 mg Oral Given 7/6/21 0907)        Assessments & Plan (with Medical Decision Making)   21-year-old female who presents for evaluation of persistent cough, shortness of breath, headache, rhinorrhea.  Differential includes pneumonia, pneumothorax, bronchospasm, URI including COVID-19, medication side effect, GERD, aspiration.  Exam reveals elevated heart rate but otherwise normal vital signs including oxygen saturation of 100%.  Lungs were clear to auscultation.  Laboratories were obtained including COVID-19 which was positive.  Chest x-ray revealed no evidence of infiltrates or other acute disease.  Patient was treated with ibuprofen, Tylenol and Ativan as well as Robitussin-AC.  We discussed smoking cessation, quarantine, indications for reevaluation and symptomatic treatment.  Patient will be discharged with Robitussin-AC and prescription for Tylenol.    I have reviewed the nursing notes. I have reviewed the findings, diagnosis, plan and need for follow up with the patient.    Discharge Medication List as of 7/6/2021 12:08 PM      START taking these medications    Details   guaiFENesin-dextromethorphan (ROBITUSSIN DM) 100-10 MG/5ML syrup Take 5 mLs by mouth 4 times daily as needed for cough, Disp-118 mL, R-0, E-Prescribe             Final diagnoses:   Infection due to 2019 novel coronavirus       --  Pawan Garcia MD  [facility] EMERGENCY DEPARTMENT  7/6/2021     Pawan Garcia MD  07/06/21 6029

## 2021-07-06 NOTE — ED NOTES
Pt c/o increasing pain in head and body. Pt with good cough, coughing constantly. Febrile at 99.6. Awaiting cough med from pharm.

## 2021-07-17 ENCOUNTER — APPOINTMENT (OUTPATIENT)
Dept: ULTRASOUND IMAGING | Facility: CLINIC | Age: 21
End: 2021-07-17
Attending: EMERGENCY MEDICINE
Payer: COMMERCIAL

## 2021-07-17 ENCOUNTER — HOSPITAL ENCOUNTER (EMERGENCY)
Facility: CLINIC | Age: 21
Discharge: HOME | End: 2021-07-18
Attending: EMERGENCY MEDICINE | Admitting: EMERGENCY MEDICINE
Payer: COMMERCIAL

## 2021-07-17 DIAGNOSIS — Z3A.01 LESS THAN 8 WEEKS GESTATION OF PREGNANCY: ICD-10-CM

## 2021-07-17 DIAGNOSIS — O26.891 OTHER SPECIFIED PREGNANCY RELATED CONDITIONS, FIRST TRIMESTER: ICD-10-CM

## 2021-07-17 DIAGNOSIS — R10.9 STOMACH ACHE: ICD-10-CM

## 2021-07-17 DIAGNOSIS — R10.9 CRAMPING AFFECTING PREGNANCY, ANTEPARTUM: ICD-10-CM

## 2021-07-17 DIAGNOSIS — O26.899 CRAMPING AFFECTING PREGNANCY, ANTEPARTUM: ICD-10-CM

## 2021-07-17 LAB
B-HCG SERPL-ACNC: 1063 IU/L (ref 0–5)
HCG UR QL: POSITIVE
HOLD SPECIMEN: NORMAL

## 2021-07-17 PROCEDURE — 99284 EMERGENCY DEPT VISIT MOD MDM: CPT | Mod: 25

## 2021-07-17 PROCEDURE — 99284 EMERGENCY DEPT VISIT MOD MDM: CPT | Performed by: EMERGENCY MEDICINE

## 2021-07-17 PROCEDURE — 36592 COLLECT BLOOD FROM PICC: CPT | Performed by: EMERGENCY MEDICINE

## 2021-07-17 PROCEDURE — 76801 OB US < 14 WKS SINGLE FETUS: CPT

## 2021-07-17 PROCEDURE — 81001 URINALYSIS AUTO W/SCOPE: CPT | Performed by: EMERGENCY MEDICINE

## 2021-07-17 PROCEDURE — 36415 COLL VENOUS BLD VENIPUNCTURE: CPT | Performed by: EMERGENCY MEDICINE

## 2021-07-17 PROCEDURE — 81025 URINE PREGNANCY TEST: CPT | Performed by: EMERGENCY MEDICINE

## 2021-07-17 PROCEDURE — 250N000011 HC RX IP 250 OP 636: Performed by: EMERGENCY MEDICINE

## 2021-07-17 PROCEDURE — 84702 CHORIONIC GONADOTROPIN TEST: CPT | Performed by: EMERGENCY MEDICINE

## 2021-07-17 RX ORDER — ONDANSETRON 4 MG/1
4 TABLET, ORALLY DISINTEGRATING ORAL ONCE
Status: COMPLETED | OUTPATIENT
Start: 2021-07-17 | End: 2021-07-17

## 2021-07-17 RX ORDER — ALBUTEROL SULFATE 90 UG/1
1-2 AEROSOL, METERED RESPIRATORY (INHALATION)
COMMUNITY
Start: 2021-07-05 | End: 2021-09-28

## 2021-07-17 RX ADMIN — ONDANSETRON 4 MG: 4 TABLET, ORALLY DISINTEGRATING ORAL at 21:05

## 2021-07-17 ASSESSMENT — ENCOUNTER SYMPTOMS
ABDOMINAL PAIN: 1
BACK PAIN: 0
NAUSEA: 1
DYSURIA: 0
VOMITING: 0

## 2021-07-18 VITALS
HEART RATE: 95 BPM | OXYGEN SATURATION: 100 % | BODY MASS INDEX: 23.65 KG/M2 | SYSTOLIC BLOOD PRESSURE: 120 MMHG | RESPIRATION RATE: 16 BRPM | WEIGHT: 151 LBS | TEMPERATURE: 99.1 F | DIASTOLIC BLOOD PRESSURE: 66 MMHG

## 2021-07-18 LAB
ALBUMIN UR-MCNC: 10 MG/DL
APPEARANCE UR: CLEAR
BACTERIA #/AREA URNS HPF: ABNORMAL /HPF
BILIRUB UR QL STRIP: NEGATIVE
COLOR UR AUTO: ABNORMAL
GLUCOSE UR STRIP-MCNC: NEGATIVE MG/DL
HGB UR QL STRIP: NEGATIVE
KETONES UR STRIP-MCNC: NEGATIVE MG/DL
LEUKOCYTE ESTERASE UR QL STRIP: ABNORMAL
MUCOUS THREADS #/AREA URNS LPF: PRESENT /LPF
NITRATE UR QL: NEGATIVE
PH UR STRIP: 6.5 [PH] (ref 5–7)
RBC URINE: 1 /HPF
SP GR UR STRIP: 1.03 (ref 1–1.03)
SQUAMOUS EPITHELIAL: 8 /HPF
UROBILINOGEN UR STRIP-MCNC: NORMAL MG/DL
WBC URINE: 9 /HPF

## 2021-07-18 NOTE — DISCHARGE INSTRUCTIONS
Please make an appointment to follow up with OB/Gyn - [facility] (phone: [phone]) for your first prenatal visit.     Continue taking prenatal vitamin.     Return to the ED if you develop worsening pain, vaginal bleeding, fever, fainting, lightheadedness, or shortness of breath.

## 2021-07-18 NOTE — ED PROVIDER NOTES
ED Provider Note  [facility]      History     Chief Complaint   Patient presents with     Abdominal Pain     has been having lower abd cramps since yesterday, had a + pregnancy test, denies any vag bleed, + nausea and vomiting     The history is provided by the patient and medical records.     Maynor Palomo is a 21 year old  female with positive home pregnancy test yesterday who presents to the ED with abdominal pain. Patient reports her LMP was at the beginning of , but she only bled for 2 days which is different from her usual 7 days of heavy bleeding. Patient had her Nexplanon taken out in April in order to start trying to get pregnant. She is unsure of how far along she might be. She reports she has a history of 1 previous miscarriage for which no surgical intervention was done. She reports a lower abdominal cramping that began yesterday when she had a positive pregnancy test at home. The cramping is not worse than her normal menstrual cramps. She is nauseous, but denies vomiting. She denies vaginal bleeding, abnormal discharge, dysuria, and back pain.       Past Medical History  Past Medical History:   Diagnosis Date     Constipation      Depressive disorder      Migraines      Past Surgical History:   Procedure Laterality Date     BIOPSY LYMPH NODE CERVICAL       LAPAROSCOPIC APPENDECTOMY N/A 2017    Procedure: LAPAROSCOPIC APPENDECTOMY;  Surgeon: Erick Casper MD;  Location: [facility] OR     TONSILLECTOMY, ADENOIDECTOMY WITH SHAVER, COMBINED       acetaminophen (TYLENOL) 325 MG tablet  albuterol (PROAIR HFA/PROVENTIL HFA/VENTOLIN HFA) 108 (90 Base) MCG/ACT inhaler  guaiFENesin-dextromethorphan (ROBITUSSIN DM) 100-10 MG/5ML syrup  nitroFURantoin macrocrystal-monohydrate (MACROBID) 100 MG capsule  Prenatal Vit-Fe Fumarate-FA (PRENATAL MULTIVITAMIN W/IRON) 27-0.8 MG tablet      Allergies   Allergen Reactions     Lactose GI Disturbance     Naproxen      Penicillins       Family History  Family History   Problem Relation Age of Onset     Depression Mother      Substance Abuse Mother         sober 6 yrs     Depression Father      Mental Illness Father         anger issues     Substance Abuse Father         current user     Depression Maternal Grandmother      Substance Abuse Maternal Grandmother      Mental Illness Paternal Grandmother      Substance Abuse Paternal Grandmother      Mental Illness Paternal Grandfather      Substance Abuse Paternal Grandfather      Mental Illness Paternal Aunt      Substance Abuse Paternal Aunt      Mental Illness Paternal Uncle      Substance Abuse Paternal Uncle      Social History   Social History     Tobacco Use     Smoking status: Current Some Day Smoker     Packs/day: 1.00     Types: Cigarettes     Last attempt to quit: 5/15/2020     Years since quittin.1     Smokeless tobacco: Never Used   Substance Use Topics     Alcohol use: Not Currently     Alcohol/week: 0.0 standard drinks     Comment: last-2 weeks ago     Drug use: Not Currently     Types: Marijuana     Comment: THC-last 2 weeks ago      Past medical history, past surgical history, medications, allergies, family history, and social history were reviewed with the patient. No additional pertinent items.       Review of Systems   Gastrointestinal: Positive for abdominal pain and nausea. Negative for vomiting.   Genitourinary: Negative for dysuria, vaginal bleeding and vaginal discharge.   Musculoskeletal: Negative for back pain.   All other systems reviewed and are negative.    A complete review of systems was performed with pertinent positives and negatives noted in the HPI, and all other systems negative.    Physical Exam   BP: 120/66  Pulse: 95  Temp: 99.1  F (37.3  C)  Resp: 16  Weight: 68.5 kg (151 lb)  SpO2: 100 %  Physical Exam  Vitals and nursing note reviewed.   Constitutional:       General: She is not in acute distress.     Appearance: Normal appearance. She is  well-developed and normal weight. She is not ill-appearing or diaphoretic.   HENT:      Head: Normocephalic and atraumatic.      Nose: Nose normal.   Eyes:      General: No scleral icterus.     Conjunctiva/sclera: Conjunctivae normal.   Cardiovascular:      Rate and Rhythm: Normal rate.   Pulmonary:      Effort: Pulmonary effort is normal. No respiratory distress.   Abdominal:      General: Abdomen is flat. There is no distension.      Palpations: Abdomen is soft.      Tenderness: There is abdominal tenderness (mild) in the suprapubic area. There is no guarding or rebound. Negative signs include Rovsing's sign.   Musculoskeletal:         General: No deformity or signs of injury. Normal range of motion.      Cervical back: Normal range of motion and neck supple. No rigidity.   Skin:     General: Skin is warm and dry.      Coloration: Skin is not jaundiced or pale.      Findings: No rash.   Neurological:      General: No focal deficit present.      Mental Status: She is alert and oriented to person, place, and time.   Psychiatric:         Mood and Affect: Mood normal.         Behavior: Behavior normal.         Thought Content: Thought content normal.         ED Course      Procedures              Results for orders placed or performed during the hospital encounter of 07/17/21   US OB <14 Weeks W Transvaginal     Status: None    Narrative    EXAM: US OB <14 WEEKS WITH TRANSVAGINAL SINGLE  LOCATION: [facility]  DATE/TIME: 7/17/2021 11:09 PM    INDICATION: Evaluate ectopic, cramping.  COMPARISON: None.  TECHNIQUE: Transabdominal scans were performed. Endovaginal ultrasound was performed to better visualize the embryo.    FINDINGS:  UTERUS: Single normal appearing intrauterine gestation sac.  MSD: Measures 0.3 cm, equals 4 weeks 5 days.  AMNIOTIC FLUID: Normal.  PLACENTA: Not yet formed. No evidence for sub-chorionic hemorrhage.    RIGHT OVARY: Normal.  LEFT OVARY: Normal.      Impression    IMPRESSION:    Solitary early gestational sac measuring 4 weeks 5 days.   HCG qualitative urine (UPT)     Status: Abnormal   Result Value Ref Range    hCG Urine Qualitative Positive (A) Negative   UA with Microscopic reflex to Culture     Status: Abnormal    Specimen: Urine, Clean Catch   Result Value Ref Range    Color Urine Light Yellow Colorless, Straw, Light Yellow, Yellow    Appearance Urine Clear Clear    Glucose Urine Negative Negative mg/dL    Bilirubin Urine Negative Negative    Ketones Urine Negative Negative mg/dL    Specific Gravity Urine 1.029 1.003 - 1.035    Blood Urine Negative Negative    pH Urine 6.5 5.0 - 7.0    Protein Albumin Urine 10  (A) Negative mg/dL    Urobilinogen Urine Normal Normal, 2.0 mg/dL    Nitrite Urine Negative Negative    Leukocyte Esterase Urine Trace (A) Negative    Bacteria Urine Few (A) None Seen /HPF    Mucus Urine Present (A) None Seen /LPF    RBC Urine 1 <=2 /HPF    WBC Urine 9 (H) <=5 /HPF    Squamous Epithelials Urine 8 (H) <=1 /HPF    Narrative    Urine Culture not indicated   HCG quantitative pregnancy     Status: Abnormal   Result Value Ref Range    hCG Quantitative 1,063 (H) 0 - 5 IU/L   Extra Purple Top Tube     Status: None   Result Value Ref Range    Hold Specimen [facility]    [place] Draw     Status: None    Narrative    The following orders were created for panel order [place] Draw.  Procedure                               Abnormality         Status                     ---------                               -----------         ------                     Extra Purple Top Tube[[id number]]                            Final result                 Please view results for these tests on the individual orders.     Medications   ondansetron (ZOFRAN-ODT) ODT tab 4 mg (4 mg Oral Given 21)        Assessments & Plan (with Medical Decision Making)   Maynor Palomo is a 21 year old  female with positive home pregnancy test yesterday who presents to the ED with abdominal  pain.     Ddx: Ectopic pregnancy, early intrauterine pregnancy, missed AB, inevitable AB, menstrual cramps    Urine pregnancy test confirmed positive.  Added on a quantitative.  Patient minimally tender and in fact states that her current menstrual like cramping pain is no more severe than her normal menstrual cramps.  She has not had any vaginal bleeding or discharge.  Will obtain a transvaginal ultrasound and urinalysis.  Quantitative beta hCG is 1063.    Ultrasound shows a solitary early gestational sac measuring 4 weeks 5 days.  Normal-appearing and intrauterine.  Normal ovaries bilaterally.    Since patient has not had any vaginal bleeding I do not think there is any particularly increased risk for an ectopic pregnancy given these findings.  Will have patient follow-up OB for her first prenatal visit.  Also encourage patient to continue taking prenatal vitamins.      Urinalysis was still in process to evaluate for UTI or bacteriuria/pyuria.  Patient requested to leave the department without waiting for the results of the urinalysis and ultrasound.  I was not available at the time to provide discharge instructions and return precautions so she was signed out AMA by the ED RN.      I have reviewed the nursing notes. I have reviewed the findings, diagnosis, plan and need for follow up with the patient.    Discharge Medication List as of 7/18/2021  1:31 AM          Final diagnoses:   Cramping affecting pregnancy, antepartum   IMartha, am serving as a trained medical scribe to document services personally performed by Yvette Mcdermott MD, based on the provider's statements to me.     Yvette COYLE MD, was physically present and have reviewed and verified the accuracy of this note documented by Martha Vernon.      --  Yvette Mcdermott MD  [facility] EMERGENCY DEPARTMENT  7/17/2021     Yvette Mcdermott MD  07/18/21 0151

## 2021-08-24 PROCEDURE — 99284 EMERGENCY DEPT VISIT MOD MDM: CPT | Performed by: EMERGENCY MEDICINE

## 2021-08-24 PROCEDURE — 99284 EMERGENCY DEPT VISIT MOD MDM: CPT | Mod: 25 | Performed by: EMERGENCY MEDICINE

## 2021-08-25 ENCOUNTER — APPOINTMENT (OUTPATIENT)
Dept: ULTRASOUND IMAGING | Facility: CLINIC | Age: 21
End: 2021-08-25
Attending: EMERGENCY MEDICINE
Payer: COMMERCIAL

## 2021-08-25 ENCOUNTER — HOSPITAL ENCOUNTER (EMERGENCY)
Facility: CLINIC | Age: 21
Discharge: HOME | End: 2021-08-25
Attending: EMERGENCY MEDICINE | Admitting: EMERGENCY MEDICINE
Payer: COMMERCIAL

## 2021-08-25 VITALS
HEART RATE: 66 BPM | RESPIRATION RATE: 16 BRPM | BODY MASS INDEX: 24.75 KG/M2 | SYSTOLIC BLOOD PRESSURE: 112 MMHG | OXYGEN SATURATION: 98 % | TEMPERATURE: 98.1 F | DIASTOLIC BLOOD PRESSURE: 66 MMHG | WEIGHT: 158 LBS

## 2021-08-25 DIAGNOSIS — N39.0 URINARY TRACT INFECTION WITHOUT HEMATURIA, SITE UNSPECIFIED: ICD-10-CM

## 2021-08-25 DIAGNOSIS — O02.1 MISSED ABORTION: ICD-10-CM

## 2021-08-25 LAB
ALBUMIN UR-MCNC: 10 MG/DL
AMORPH CRY #/AREA URNS HPF: ABNORMAL /HPF
APPEARANCE UR: ABNORMAL
BACTERIA #/AREA URNS HPF: ABNORMAL /HPF
BILIRUB UR QL STRIP: NEGATIVE
COLOR UR AUTO: ABNORMAL
GLUCOSE UR STRIP-MCNC: NEGATIVE MG/DL
HGB UR QL STRIP: NEGATIVE
KETONES UR STRIP-MCNC: NEGATIVE MG/DL
LEUKOCYTE ESTERASE UR QL STRIP: ABNORMAL
NITRATE UR QL: POSITIVE
PH UR STRIP: 7.5 [PH] (ref 5–7)
RBC URINE: 1 /HPF
SP GR UR STRIP: 1.03 (ref 1–1.03)
SQUAMOUS EPITHELIAL: 8 /HPF
TRANSITIONAL EPI: <1 /HPF
UROBILINOGEN UR STRIP-MCNC: NORMAL MG/DL
WBC URINE: 13 /HPF

## 2021-08-25 PROCEDURE — 250N000013 HC RX MED GY IP 250 OP 250 PS 637: Performed by: EMERGENCY MEDICINE

## 2021-08-25 PROCEDURE — 81001 URINALYSIS AUTO W/SCOPE: CPT | Performed by: EMERGENCY MEDICINE

## 2021-08-25 PROCEDURE — 76801 OB US < 14 WKS SINGLE FETUS: CPT

## 2021-08-25 PROCEDURE — 99213 OFFICE O/P EST LOW 20 MIN: CPT | Mod: GC | Performed by: OBSTETRICS & GYNECOLOGY

## 2021-08-25 RX ORDER — OXYCODONE HYDROCHLORIDE 5 MG/1
5 TABLET ORAL ONCE
Status: COMPLETED | OUTPATIENT
Start: 2021-08-25 | End: 2021-08-25

## 2021-08-25 RX ORDER — MISOPROSTOL 200 UG/1
600 TABLET ORAL ONCE
Qty: 3 TABLET | Refills: 0 | Status: SHIPPED | OUTPATIENT
Start: 2021-08-25 | End: 2021-08-25

## 2021-08-25 RX ORDER — CEFDINIR 300 MG/1
300 CAPSULE ORAL ONCE
Status: COMPLETED | OUTPATIENT
Start: 2021-08-25 | End: 2021-08-25

## 2021-08-25 RX ORDER — IBUPROFEN 600 MG/1
600 TABLET, FILM COATED ORAL EVERY 6 HOURS PRN
Qty: 20 TABLET | Refills: 0 | Status: ON HOLD | OUTPATIENT
Start: 2021-08-25 | End: 2021-09-07

## 2021-08-25 RX ORDER — ACETAMINOPHEN 500 MG
1000 TABLET ORAL ONCE
Status: COMPLETED | OUTPATIENT
Start: 2021-08-25 | End: 2021-08-25

## 2021-08-25 RX ORDER — CEFDINIR 300 MG/1
300 CAPSULE ORAL 2 TIMES DAILY
Qty: 14 CAPSULE | Refills: 0 | Status: SHIPPED | OUTPATIENT
Start: 2021-08-25 | End: 2021-09-28

## 2021-08-25 RX ORDER — IBUPROFEN 600 MG/1
600 TABLET, FILM COATED ORAL ONCE
Status: COMPLETED | OUTPATIENT
Start: 2021-08-25 | End: 2021-08-25

## 2021-08-25 RX ORDER — OXYCODONE HYDROCHLORIDE 5 MG/1
5-10 TABLET ORAL EVERY 6 HOURS PRN
Qty: 6 TABLET | Refills: 0 | Status: ON HOLD | OUTPATIENT
Start: 2021-08-25 | End: 2021-09-07

## 2021-08-25 RX ADMIN — ACETAMINOPHEN 1000 MG: 500 TABLET ORAL at 02:44

## 2021-08-25 RX ADMIN — CEFDINIR 300 MG: 300 CAPSULE ORAL at 07:40

## 2021-08-25 RX ADMIN — IBUPROFEN 600 MG: 600 TABLET, FILM COATED ORAL at 08:16

## 2021-08-25 RX ADMIN — OXYCODONE HYDROCHLORIDE 5 MG: 5 TABLET ORAL at 05:48

## 2021-08-25 NOTE — DISCHARGE INSTRUCTIONS
Take the medications as prescribed. Follow up with OB clinic within the next few days. Return with any worsening or concerns.     Please make an appointment to follow up with OB/Gyn - [facility] (phone: [phone]) as needed.

## 2021-08-25 NOTE — ED PROVIDER NOTES
ED Provider Note  [facility]      History     Chief Complaint   Patient presents with     Pregnancy Problem (Cpm)     Pt was told by OB this morning that fetal heart tones were lost. 10 weeks gestation. Was told to follow up and came here tonight because of increased pain. Denies vaginal bleeding.     HPI  Maynor Palomo is a 21 year old female, [id number] who with increased pelvic cramping.  She reports that she was 10-week 1 day pregnant today, based on a 4-week ultrasound.  She went to her OB clinic in [facility] today, was told that she no longer had fetal heart rate.  She reports that she has had pelvic cramping for a day and a half.  She denies any vaginal bleeding.  She states they told her she may end up having to have a D&C.  She states that tonight her pelvic pain and cramping has worsened.  She states that there is constant pain, but it spikes up a time.  She is also having low back pain over the same timeframe.  No dysuria.  She states that she checked her temperature and it was 99.1, so she became more concerned and decided to come in.  No cough or shortness of breath, vomiting, diarrhea.    Past Medical History  Past Medical History:   Diagnosis Date     Constipation      Depressive disorder      Migraines      Past Surgical History:   Procedure Laterality Date     BIOPSY LYMPH NODE CERVICAL       LAPAROSCOPIC APPENDECTOMY N/A 2017    Procedure: LAPAROSCOPIC APPENDECTOMY;  Surgeon: Erick Casper MD;  Location: [facility] OR     TONSILLECTOMY, ADENOIDECTOMY WITH SHAVER, COMBINED       cefdinir (OMNICEF) 300 MG capsule  Prenatal Vit-Fe Fumarate-FA (PRENATAL MULTIVITAMIN W/IRON) 27-0.8 MG tablet  acetaminophen (TYLENOL) 325 MG tablet  albuterol (PROAIR HFA/PROVENTIL HFA/VENTOLIN HFA) 108 (90 Base) MCG/ACT inhaler  guaiFENesin-dextromethorphan (ROBITUSSIN DM) 100-10 MG/5ML syrup  nitroFURantoin macrocrystal-monohydrate (MACROBID) 100 MG capsule      Allergies   Allergen  Reactions     Lactose GI Disturbance     Naproxen      Penicillins      Family History  Family History   Problem Relation Age of Onset     Depression Mother      Substance Abuse Mother         sober 6 yrs     Depression Father      Mental Illness Father         anger issues     Substance Abuse Father         current user     Depression Maternal Grandmother      Substance Abuse Maternal Grandmother      Mental Illness Paternal Grandmother      Substance Abuse Paternal Grandmother      Mental Illness Paternal Grandfather      Substance Abuse Paternal Grandfather      Mental Illness Paternal Aunt      Substance Abuse Paternal Aunt      Mental Illness Paternal Uncle      Substance Abuse Paternal Uncle      Social History   Social History     Tobacco Use     Smoking status: Current Some Day Smoker     Packs/day: 1.00     Types: Cigarettes     Last attempt to quit: 5/15/2020     Years since quittin.2     Smokeless tobacco: Never Used   Substance Use Topics     Alcohol use: Not Currently     Alcohol/week: 0.0 standard drinks     Comment: last-2 weeks ago     Drug use: Not Currently     Types: Marijuana     Comment: THC-last 2 weeks ago      Past medical history, past surgical history, medications, allergies, family history, and social history were reviewed with the patient. No additional pertinent items.       Review of Systems  A complete review of systems was performed with pertinent positives and negatives noted in the HPI, and all other systems negative.    Physical Exam   BP: 116/69  Pulse: 86  Temp: 99.1  F (37.3  C)  Resp: 16  Weight: 71.7 kg (158 lb)  SpO2: 98 %  Physical Exam  Constitutional:       General: She is not in acute distress.     Appearance: She is not diaphoretic.   HENT:      Head: Atraumatic.   Eyes:      General: No scleral icterus.  Cardiovascular:      Heart sounds: Normal heart sounds.   Pulmonary:      Effort: No respiratory distress.      Breath sounds: Normal breath sounds.   Abdominal:       Palpations: Abdomen is soft.      Tenderness: There is abdominal tenderness (mild suprapubic tenderness with palpation - no guarding).   Musculoskeletal:         General: Tenderness present.        Back:       Comments: Distal strength and sensation normal/equal bilaterally   Skin:     General: Skin is warm.      Findings: No rash.         ED Course      Procedures              Results for orders placed or performed during the hospital encounter of 08/25/21   US OB < 14 Weeks Single     Status: None    Narrative    EXAM: ULTRASOUND OBSTETRIC < 14 WEEKS SINGLE  LOCATION: [facility]  DATE/TIME: 08/25/2021, 4:33 AM    INDICATION: Pregnant patient with pelvic cramping. Evaluate for fetal viability.  COMPARISON: None.  TECHNIQUE: Transabdominal scans were performed. Endovaginal ultrasound was performed to better visualize the embryo.    FINDINGS:  UTERUS/ENDOMETRIUM: Irregular gestational sac containing internal debris. Crown-rump length of 1.5 cm correlating to gestational age of 7 weeks and 0 days. No cardiac activity present.    RIGHT OVARY: Not visualized due to bowel gas.  LEFT OVARY: 2.9 x 2 x 3.2 cm.    No free fluid in the pelvis.      Impression    IMPRESSION:   1.  Irregular gestational sac containing debris with a fetal pole having a gestational age of 7 weeks and 6 days. However, there is no evidence for cardiac activity. Findings compatible with pregnancy failure.    Findings Diagnostic of Pregnancy Failure    CRL >7mm and no FHR  MSD >25 mm and no embryo  Absence of embryo with FHR >11 days after a previous US showed a gestational sac with a yolk sac.  Absence of embryo with FHR >2 weeks after a previous US showed a gestational sac without a yolk sac.    Reference: Diagnostic Criteria for Nonviable Pregnancy Early in the First Trimester. Ultrasound Quarterly; 30(1): 3-9, March 2014     UA with Microscopic     Status: Abnormal   Result Value Ref Range    Color  Urine Light Yellow Colorless, Straw, Light Yellow, Yellow    Appearance Urine Slightly Cloudy (A) Clear    Glucose Urine Negative Negative mg/dL    Bilirubin Urine Negative Negative    Ketones Urine Negative Negative mg/dL    Specific Gravity Urine 1.028 1.003 - 1.035    Blood Urine Negative Negative    pH Urine 7.5 (H) 5.0 - 7.0    Protein Albumin Urine 10  (A) Negative mg/dL    Urobilinogen Urine Normal Normal, 2.0 mg/dL    Nitrite Urine Positive (A) Negative    Leukocyte Esterase Urine Small (A) Negative    Bacteria Urine Few (A) None Seen /HPF    Amorphous Crystals Urine Few (A) None Seen /HPF    RBC Urine 1 <=2 /HPF    WBC Urine 13 (H) <=5 /HPF    Squamous Epithelials Urine 8 (H) <=1 /HPF    Transitional Epithelials Urine <1 <=1 /HPF     Medications   acetaminophen (TYLENOL) tablet 1,000 mg (1,000 mg Oral Given 8/25/21 0244)   oxyCODONE (ROXICODONE) tablet 5 mg (5 mg Oral Given 8/25/21 0214)   cefdinir (OMNICEF) capsule 300 mg (300 mg Oral Given 8/25/21 0240)        Assessments & Plan (with Medical Decision Making)   Patient reports that she is having a lot of discomfort.  She does not appear in distress upon arrival.  She was offered some Tylenol.  Patient here for ultrasound as I do not have access to the patient's clinic records to confirm.  Ultrasound showed an irregular gestational sac containing debris with a fetal pole having gestational age of 7 weeks 6 days without evidence of cardiac activity-compatible with pregnancy failure.  I did discuss this with OB who recommended the patient follow-up with her outpatient OB clinic for further management planning.  The patient was upset with this, feeling that she should not have to leave here in the same condition she came in.  She was angry that she had not gotten anything further for pain.  I did give her 1 dose of oxycodone.  She did have a complaint of a lot of back pain.  There is been no trauma.  Pain is fairly diffuse across her back, worse on the left.   UA is somewhat concerning for infection, with positive nitrite and LE, but there are some squamous cells.  Culture was sent.  Given the back pain I will go ahead and treat for UTI.  She does report a penicillin allergy, states she feels she had a rash previously.  No anaphylaxis.  I will treat with Omnicef.  She reported to me that she felt she had had a very bad experience in her OB clinic yesterday, does not wish to continue her care there.  I did speak with OB here and asked them to come and see the patient and discuss options with her, as the patient is interested in pharmacologic management.  The patient be signed out to the oncoming provider at shift change pending OB evaluation.  ADDENDUM:  OB did see the patient, the patient does have medical management and was given a prescription for misoprostol.  She is to follow-up with OB as needed.    Dictation Disclaimer: Some of this Note has been completed with voice-recognition dictation software. Although errors are generally corrected real-time, there is the potential for a rare error to be present in the completed chart.      I have reviewed the nursing notes. I have reviewed the findings, diagnosis, plan and need for follow up with the patient.    New Prescriptions    CEFDINIR (OMNICEF) 300 MG CAPSULE    Take 1 capsule (300 mg) by mouth 2 times daily       Final diagnoses:   Missed    Urinary tract infection without hematuria, site unspecified       --  Juhi Mccabe  [facility] EMERGENCY DEPARTMENT  2021     Juhi Mccabe MD  21 0750       Juhi Mccabe MD  21 [id number]

## 2021-08-25 NOTE — CONSULTS
OB/GYN Consult  Maynor Palomo   2000  MRN [id number]    CC: miscarriage    Consultation requested by Dr. Juhi Mccabe.     HPI: Maynor Palomo is a 21 year old  who presents with lower pelvic and back cramping. Had US yesterday showing non-viable IUP at outside clinic. No headaches, vision changes, nausea, vomiting, fevers, chills, constipation, diarrhea, dysuria, changes in vaginal discharge or edema in extremities noted.      Past Medical History:   Diagnosis Date     Constipation      Depressive disorder      Migraines         Past Surgical History:   Procedure Laterality Date     BIOPSY LYMPH NODE CERVICAL       LAPAROSCOPIC APPENDECTOMY N/A 2017    Procedure: LAPAROSCOPIC APPENDECTOMY;  Surgeon: Erick Casper MD;  Location: [facility]     TONSILLECTOMY, ADENOIDECTOMY WITH SHAVER, COMBINED          acetaminophen (TYLENOL) tablet 650 mg  ibuprofen (ADVIL,MOTRIN) tablet 400 mg    Prenatal Vit-Fe Fumarate-FA (PRENATAL MULTIVITAMIN W/IRON) 27-0.8 MG tablet, Take 1 tablet by mouth daily  acetaminophen (TYLENOL) 325 MG tablet, Take 1-2 tablets (325-650 mg) by mouth every 6 hours as needed for mild pain, headaches or pain  albuterol (PROAIR HFA/PROVENTIL HFA/VENTOLIN HFA) 108 (90 Base) MCG/ACT inhaler, Inhale 1-2 puffs into the lungs  guaiFENesin-dextromethorphan (ROBITUSSIN DM) 100-10 MG/5ML syrup, Take 5 mLs by mouth 4 times daily as needed for cough  nitroFURantoin macrocrystal-monohydrate (MACROBID) 100 MG capsule, Take 100 mg by mouth daily         Allergies   Allergen Reactions     Lactose GI Disturbance     Naproxen      Penicillins         Social History     Socioeconomic History     Marital status: Single     Spouse name: Not on file     Number of children: Not on file     Years of education: Not on file     Highest education level: Not on file   Occupational History     Not on file   Tobacco Use     Smoking status: Current Some Day Smoker     Packs/day: 1.00     Types:  Cigarettes     Last attempt to quit: 5/15/2020     Years since quittin.2     Smokeless tobacco: Never Used   Substance and Sexual Activity     Alcohol use: Not Currently     Alcohol/week: 0.0 standard drinks     Comment: last-2 weeks ago     Drug use: Not Currently     Types: Marijuana     Comment: THC-last 2 weeks ago     Sexual activity: Yes     Partners: Male     Comment: 2016 - Nexplanon placed   Other Topics Concern     Not on file   Social History Narrative    ** Merged History Encounter **          Social Determinants of Health     Financial Resource Strain:      Difficulty of Paying Living Expenses:    Food Insecurity:      Worried About Running Out of Food in the Last Year:      Ran Out of Food in the Last Year:    Transportation Needs:      Lack of Transportation (Medical):      Lack of Transportation (Non-Medical):    Physical Activity:      Days of Exercise per Week:      Minutes of Exercise per Session:    Stress:      Feeling of Stress :    Social Connections:      Frequency of Communication with Friends and Family:      Frequency of Social Gatherings with Friends and Family:      Attends [employer] Services:      Active Member of Clubs or Organizations:      Attends Club or Organization Meetings:      Marital Status:    Intimate Partner Violence:      Fear of Current or Ex-Partner:      Emotionally Abused:      Physically Abused:      Sexually Abused:         Objective:  Vitals:    21 2347 21 0814   BP: 116/69 112/66   Pulse: 86 66   Resp: 16 16   Temp: 99.1  F (37.3  C) 98.1  F (36.7  C)   TempSrc: Oral Oral   SpO2: 98% 98%   Weight: 71.7 kg (158 lb)      Gen: appears appropriately sad, NAD, cooperative  Abd: Soft, non distended, mildly tender in lower abdomen and back.   : no bleeding    Labs/Imaging:  Results for orders placed or performed during the hospital encounter of 20 (from the past 24 hour(s))   UA with Microscopic   Result Value Ref Range    Color Urine Light  Yellow Colorless, Straw, Light Yellow, Yellow    Appearance Urine Slightly Cloudy (A) Clear    Glucose Urine Negative Negative mg/dL    Bilirubin Urine Negative Negative    Ketones Urine Negative Negative mg/dL    Specific Gravity Urine 1.028 1.003 - 1.035    Blood Urine Negative Negative    pH Urine 7.5 (H) 5.0 - 7.0    Protein Albumin Urine 10  (A) Negative mg/dL    Urobilinogen Urine Normal Normal, 2.0 mg/dL    Nitrite Urine Positive (A) Negative    Leukocyte Esterase Urine Small (A) Negative    Bacteria Urine Few (A) None Seen /HPF    Amorphous Crystals Urine Few (A) None Seen /HPF    RBC Urine 1 <=2 /HPF    WBC Urine 13 (H) <=5 /HPF    Squamous Epithelials Urine 8 (H) <=1 /HPF    Transitional Epithelials Urine <1 <=1 /HPF   US OB < 14 Weeks Single    Narrative    EXAM: ULTRASOUND OBSTETRIC < 14 WEEKS SINGLE  LOCATION: [facility]  DATE/TIME: 08/25/2021, 4:33 AM    INDICATION: Pregnant patient with pelvic cramping. Evaluate for fetal viability.  COMPARISON: None.  TECHNIQUE: Transabdominal scans were performed. Endovaginal ultrasound was performed to better visualize the embryo.    FINDINGS:  UTERUS/ENDOMETRIUM: Irregular gestational sac containing internal debris. Crown-rump length of 1.5 cm correlating to gestational age of 7 weeks and 0 days. No cardiac activity present.    RIGHT OVARY: Not visualized due to bowel gas.  LEFT OVARY: 2.9 x 2 x 3.2 cm.    No free fluid in the pelvis.      Impression    IMPRESSION:   1.  Irregular gestational sac containing debris with a fetal pole having a gestational age of 7 weeks and 6 days. However, there is no evidence for cardiac activity. Findings compatible with pregnancy failure.    Findings Diagnostic of Pregnancy Failure    CRL >7mm and no FHR  MSD >25 mm and no embryo  Absence of embryo with FHR >11 days after a previous US showed a gestational sac with a yolk sac.  Absence of embryo with FHR >2 weeks after a previous US  showed a gestational sac without a yolk sac.    Reference: Diagnostic Criteria for Nonviable Pregnancy Early in the First Trimester. Ultrasound Quarterly; 30(1): 3-9         Assessment/Plan: Maynor Palomo is a 21 year old  here with missed AB, non-viable IUP confirmed by US here. She is having some abdominal and back pain but no bleeding and is hemodynamically stable. Rh positive. Discussed expectant, medical, or surgical management, risks and benefits to each. She would like medical management. Plan buccal misoprostol 600 mcg with clinic follow up afterwards. Plan for ibuprofen and oxycodone for pain control during passage of tissue. Patient accepting of this plan.     Patient seen and care plan discussed under supervision of  Dr. Lester.    Thank you for allowing us to be involved in the care of your patient. Please do not hesitate to give us a call with further questions. Team OB/GYN consult pagers [phone] from 6:30AM to 6:30PM or [phone] from 6PM to 6:30AM.    Debbie Lazar MD PGY-4  Department of OB/GYN  2021 8:19 AM

## 2021-08-28 ENCOUNTER — HOSPITAL ENCOUNTER (EMERGENCY)
Facility: CLINIC | Age: 21
Discharge: HOME | End: 2021-08-28
Attending: EMERGENCY MEDICINE | Admitting: EMERGENCY MEDICINE
Payer: COMMERCIAL

## 2021-08-28 ENCOUNTER — APPOINTMENT (OUTPATIENT)
Dept: CT IMAGING | Facility: CLINIC | Age: 21
End: 2021-08-28
Attending: EMERGENCY MEDICINE
Payer: COMMERCIAL

## 2021-08-28 VITALS
TEMPERATURE: 99.5 F | DIASTOLIC BLOOD PRESSURE: 75 MMHG | OXYGEN SATURATION: 100 % | RESPIRATION RATE: 16 BRPM | HEART RATE: 85 BPM | SYSTOLIC BLOOD PRESSURE: 120 MMHG

## 2021-08-28 DIAGNOSIS — S01.81XA FACIAL LACERATION, INITIAL ENCOUNTER: ICD-10-CM

## 2021-08-28 DIAGNOSIS — T07.XXXA MULTIPLE TRAUMA: ICD-10-CM

## 2021-08-28 LAB
ALBUMIN SERPL-MCNC: 3.5 G/DL (ref 3.4–5)
ALCOHOL BREATH TEST: 0 (ref 0–0.01)
ALP SERPL-CCNC: 44 U/L (ref 40–150)
ALT SERPL W P-5'-P-CCNC: 14 U/L (ref 0–50)
ANION GAP SERPL CALCULATED.3IONS-SCNC: 6 MMOL/L (ref 3–14)
AST SERPL W P-5'-P-CCNC: 13 U/L (ref 0–45)
BASOPHILS # BLD AUTO: 0 10E3/UL (ref 0–0.2)
BASOPHILS NFR BLD AUTO: 0 %
BILIRUB SERPL-MCNC: 0.3 MG/DL (ref 0.2–1.3)
BUN SERPL-MCNC: 11 MG/DL (ref 7–30)
CALCIUM SERPL-MCNC: 8.8 MG/DL (ref 8.5–10.1)
CHLORIDE BLD-SCNC: 108 MMOL/L (ref 94–109)
CO2 SERPL-SCNC: 25 MMOL/L (ref 20–32)
CREAT SERPL-MCNC: 0.81 MG/DL (ref 0.52–1.04)
EOSINOPHIL # BLD AUTO: 0.1 10E3/UL (ref 0–0.7)
EOSINOPHIL NFR BLD AUTO: 1 %
ERYTHROCYTE [DISTWIDTH] IN BLOOD BY AUTOMATED COUNT: 13.7 % (ref 10–15)
GFR SERPL CREATININE-BSD FRML MDRD: >90 ML/MIN/1.73M2
GLUCOSE BLD-MCNC: 109 MG/DL (ref 70–99)
HCT VFR BLD AUTO: 35.3 % (ref 35–47)
HGB BLD-MCNC: 12 G/DL (ref 11.7–15.7)
IMM GRANULOCYTES # BLD: 0 10E3/UL
IMM GRANULOCYTES NFR BLD: 0 %
LYMPHOCYTES # BLD AUTO: 1.8 10E3/UL (ref 0.8–5.3)
LYMPHOCYTES NFR BLD AUTO: 24 %
MCH RBC QN AUTO: 27.4 PG (ref 26.5–33)
MCHC RBC AUTO-ENTMCNC: 34 G/DL (ref 31.5–36.5)
MCV RBC AUTO: 81 FL (ref 78–100)
MONOCYTES # BLD AUTO: 0.6 10E3/UL (ref 0–1.3)
MONOCYTES NFR BLD AUTO: 7 %
NEUTROPHILS # BLD AUTO: 5 10E3/UL (ref 1.6–8.3)
NEUTROPHILS NFR BLD AUTO: 68 %
NRBC # BLD AUTO: 0 10E3/UL
NRBC BLD AUTO-RTO: 0 /100
PLATELET # BLD AUTO: 209 10E3/UL (ref 150–450)
POTASSIUM BLD-SCNC: 3.4 MMOL/L (ref 3.4–5.3)
PROT SERPL-MCNC: 7.1 G/DL (ref 6.8–8.8)
RBC # BLD AUTO: 4.38 10E6/UL (ref 3.8–5.2)
SODIUM SERPL-SCNC: 139 MMOL/L (ref 133–144)
WBC # BLD AUTO: 7.5 10E3/UL (ref 4–11)

## 2021-08-28 PROCEDURE — 250N000013 HC RX MED GY IP 250 OP 250 PS 637: Performed by: EMERGENCY MEDICINE

## 2021-08-28 PROCEDURE — 12011 RPR F/E/E/N/L/M 2.5 CM/<: CPT | Performed by: EMERGENCY MEDICINE

## 2021-08-28 PROCEDURE — 36415 COLL VENOUS BLD VENIPUNCTURE: CPT | Performed by: EMERGENCY MEDICINE

## 2021-08-28 PROCEDURE — 96374 THER/PROPH/DIAG INJ IV PUSH: CPT | Mod: 59 | Performed by: EMERGENCY MEDICINE

## 2021-08-28 PROCEDURE — 258N000003 HC RX IP 258 OP 636: Performed by: EMERGENCY MEDICINE

## 2021-08-28 PROCEDURE — 250N000011 HC RX IP 250 OP 636: Performed by: EMERGENCY MEDICINE

## 2021-08-28 PROCEDURE — 99284 EMERGENCY DEPT VISIT MOD MDM: CPT | Mod: 25 | Performed by: EMERGENCY MEDICINE

## 2021-08-28 PROCEDURE — 96375 TX/PRO/DX INJ NEW DRUG ADDON: CPT | Performed by: EMERGENCY MEDICINE

## 2021-08-28 PROCEDURE — 80053 COMPREHEN METABOLIC PANEL: CPT | Performed by: EMERGENCY MEDICINE

## 2021-08-28 PROCEDURE — 70486 CT MAXILLOFACIAL W/O DYE: CPT

## 2021-08-28 PROCEDURE — 96361 HYDRATE IV INFUSION ADD-ON: CPT | Mod: 59 | Performed by: EMERGENCY MEDICINE

## 2021-08-28 PROCEDURE — 85025 COMPLETE CBC W/AUTO DIFF WBC: CPT | Performed by: EMERGENCY MEDICINE

## 2021-08-28 PROCEDURE — 90791 PSYCH DIAGNOSTIC EVALUATION: CPT

## 2021-08-28 PROCEDURE — 99285 EMERGENCY DEPT VISIT HI MDM: CPT | Mod: 25 | Performed by: EMERGENCY MEDICINE

## 2021-08-28 PROCEDURE — 70450 CT HEAD/BRAIN W/O DYE: CPT

## 2021-08-28 PROCEDURE — 82075 ASSAY OF BREATH ETHANOL: CPT | Performed by: EMERGENCY MEDICINE

## 2021-08-28 RX ORDER — LIDOCAINE HYDROCHLORIDE 10 MG/ML
INJECTION, SOLUTION EPIDURAL; INFILTRATION; INTRACAUDAL; PERINEURAL
Status: DISCONTINUED
Start: 2021-08-28 | End: 2021-08-28 | Stop reason: HOSPADM

## 2021-08-28 RX ORDER — LIDOCAINE HYDROCHLORIDE 10 MG/ML
5 INJECTION, SOLUTION INFILTRATION; PERINEURAL ONCE
Status: DISCONTINUED | OUTPATIENT
Start: 2021-08-28 | End: 2021-08-28

## 2021-08-28 RX ORDER — KETOROLAC TROMETHAMINE 30 MG/ML
30 INJECTION, SOLUTION INTRAMUSCULAR; INTRAVENOUS ONCE
Status: DISCONTINUED | OUTPATIENT
Start: 2021-08-28 | End: 2021-08-28

## 2021-08-28 RX ORDER — ONDANSETRON 4 MG/1
4 TABLET, ORALLY DISINTEGRATING ORAL ONCE
Status: CANCELLED | OUTPATIENT
Start: 2021-08-28 | End: 2021-08-28

## 2021-08-28 RX ORDER — ONDANSETRON 2 MG/ML
4 INJECTION INTRAMUSCULAR; INTRAVENOUS EVERY 30 MIN PRN
Status: DISCONTINUED | OUTPATIENT
Start: 2021-08-28 | End: 2021-08-28 | Stop reason: HOSPADM

## 2021-08-28 RX ORDER — HYDROCODONE BITARTRATE AND ACETAMINOPHEN 5; 325 MG/1; MG/1
1 TABLET ORAL ONCE
Status: COMPLETED | OUTPATIENT
Start: 2021-08-28 | End: 2021-08-28

## 2021-08-28 RX ORDER — ACETAMINOPHEN 500 MG
1000 TABLET ORAL ONCE
Status: COMPLETED | OUTPATIENT
Start: 2021-08-28 | End: 2021-08-28

## 2021-08-28 RX ORDER — ACETAMINOPHEN 325 MG/1
650 TABLET ORAL EVERY 4 HOURS PRN
Status: DISCONTINUED | OUTPATIENT
Start: 2021-08-28 | End: 2021-08-28

## 2021-08-28 RX ORDER — KETOROLAC TROMETHAMINE 30 MG/ML
30 INJECTION, SOLUTION INTRAMUSCULAR; INTRAVENOUS ONCE
Status: COMPLETED | OUTPATIENT
Start: 2021-08-28 | End: 2021-08-28

## 2021-08-28 RX ORDER — SODIUM CHLORIDE 9 MG/ML
INJECTION, SOLUTION INTRAVENOUS CONTINUOUS
Status: DISCONTINUED | OUTPATIENT
Start: 2021-08-28 | End: 2021-08-28 | Stop reason: HOSPADM

## 2021-08-28 RX ADMIN — HYDROCODONE BITARTRATE AND ACETAMINOPHEN 1 TABLET: 5; 325 TABLET ORAL at 17:11

## 2021-08-28 RX ADMIN — SODIUM CHLORIDE: 9 INJECTION, SOLUTION INTRAVENOUS at 08:58

## 2021-08-28 RX ADMIN — KETOROLAC TROMETHAMINE 30 MG: 30 INJECTION, SOLUTION INTRAMUSCULAR; INTRAVENOUS at 08:58

## 2021-08-28 RX ADMIN — ACETAMINOPHEN 650 MG: 325 TABLET, FILM COATED ORAL at 08:31

## 2021-08-28 RX ADMIN — ACETAMINOPHEN 1000 MG: 500 TABLET ORAL at 11:58

## 2021-08-28 RX ADMIN — ONDANSETRON 4 MG: 2 INJECTION INTRAMUSCULAR; INTRAVENOUS at 08:50
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
## 2021-08-28 NOTE — DISCHARGE INSTRUCTIONS
- Follow up with your PCP for suture removal in 5-7 days  - OB appointment for D&C  - [facility]  - Tylenol and ibuprofen for pain as needed
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
## 2021-08-29 ASSESSMENT — ENCOUNTER SYMPTOMS
SHORTNESS OF BREATH: 0
HEADACHES: 0
CONFUSION: 0
NECK STIFFNESS: 0
COLOR CHANGE: 0
ABDOMINAL PAIN: 0
FEVER: 0
EYE REDNESS: 0
DIFFICULTY URINATING: 0
ARTHRALGIAS: 0

## 2021-09-07 ENCOUNTER — ANESTHESIA (OUTPATIENT)
Dept: SURGERY | Facility: CLINIC | Age: 21
End: 2021-09-07
Payer: COMMERCIAL

## 2021-09-07 ENCOUNTER — HOSPITAL ENCOUNTER (OUTPATIENT)
Facility: CLINIC | Age: 21
Discharge: HOME | End: 2021-09-07
Attending: EMERGENCY MEDICINE | Admitting: OBSTETRICS & GYNECOLOGY
Payer: COMMERCIAL

## 2021-09-07 ENCOUNTER — APPOINTMENT (OUTPATIENT)
Dept: ULTRASOUND IMAGING | Facility: CLINIC | Age: 21
End: 2021-09-07
Attending: EMERGENCY MEDICINE
Payer: COMMERCIAL

## 2021-09-07 ENCOUNTER — ANESTHESIA EVENT (OUTPATIENT)
Dept: SURGERY | Facility: CLINIC | Age: 21
End: 2021-09-07
Payer: COMMERCIAL

## 2021-09-07 VITALS
SYSTOLIC BLOOD PRESSURE: 111 MMHG | OXYGEN SATURATION: 100 % | HEART RATE: 65 BPM | RESPIRATION RATE: 14 BRPM | BODY MASS INDEX: 24.81 KG/M2 | TEMPERATURE: 97.9 F | WEIGHT: 158.07 LBS | HEIGHT: 67 IN | DIASTOLIC BLOOD PRESSURE: 72 MMHG

## 2021-09-07 DIAGNOSIS — O03.4 INCOMPLETE ABORTION: Primary | ICD-10-CM

## 2021-09-07 LAB
ABO/RH(D): NORMAL
ANTIBODY SCREEN: NEGATIVE
B-HCG SERPL-ACNC: 2466 IU/L (ref 0–5)
BASOPHILS # BLD AUTO: 0 10E3/UL (ref 0–0.2)
BASOPHILS NFR BLD AUTO: 1 %
BLD PROD TYP BPU: NORMAL
BLD PROD TYP BPU: NORMAL
BLOOD COMPONENT TYPE: NORMAL
BLOOD COMPONENT TYPE: NORMAL
CODING SYSTEM: NORMAL
CODING SYSTEM: NORMAL
CROSSMATCH: NORMAL
CROSSMATCH: NORMAL
EOSINOPHIL # BLD AUTO: 0 10E3/UL (ref 0–0.7)
EOSINOPHIL NFR BLD AUTO: 0 %
ERYTHROCYTE [DISTWIDTH] IN BLOOD BY AUTOMATED COUNT: 14 % (ref 10–15)
HCT VFR BLD AUTO: 31.2 % (ref 35–47)
HGB BLD-MCNC: 10.5 G/DL (ref 11.7–15.7)
HOLD SPECIMEN: NORMAL
IMM GRANULOCYTES # BLD: 0 10E3/UL
IMM GRANULOCYTES NFR BLD: 0 %
LYMPHOCYTES # BLD AUTO: 1.5 10E3/UL (ref 0.8–5.3)
LYMPHOCYTES NFR BLD AUTO: 30 %
MCH RBC QN AUTO: 27.5 PG (ref 26.5–33)
MCHC RBC AUTO-ENTMCNC: 33.7 G/DL (ref 31.5–36.5)
MCV RBC AUTO: 82 FL (ref 78–100)
MONOCYTES # BLD AUTO: 0.3 10E3/UL (ref 0–1.3)
MONOCYTES NFR BLD AUTO: 7 %
NEUTROPHILS # BLD AUTO: 3.2 10E3/UL (ref 1.6–8.3)
NEUTROPHILS NFR BLD AUTO: 62 %
NRBC # BLD AUTO: 0 10E3/UL
NRBC BLD AUTO-RTO: 0 /100
PLATELET # BLD AUTO: 199 10E3/UL (ref 150–450)
RBC # BLD AUTO: 3.82 10E6/UL (ref 3.8–5.2)
SARS-COV-2 RNA RESP QL NAA+PROBE: NEGATIVE
SPECIMEN EXPIRATION DATE: NORMAL
UNIT ABO/RH: NORMAL
UNIT ABO/RH: NORMAL
UNIT NUMBER: NORMAL
UNIT NUMBER: NORMAL
UNIT STATUS: NORMAL
UNIT STATUS: NORMAL
UNIT TYPE ISBT: 6200
UNIT TYPE ISBT: 6200
WBC # BLD AUTO: 5.2 10E3/UL (ref 4–11)

## 2021-09-07 PROCEDURE — 370N000017 HC ANESTHESIA TECHNICAL FEE, PER MIN: Performed by: OBSTETRICS & GYNECOLOGY

## 2021-09-07 PROCEDURE — 85025 COMPLETE CBC W/AUTO DIFF WBC: CPT | Performed by: EMERGENCY MEDICINE

## 2021-09-07 PROCEDURE — 999N000141 HC STATISTIC PRE-PROCEDURE NURSING ASSESSMENT: Performed by: OBSTETRICS & GYNECOLOGY

## 2021-09-07 PROCEDURE — 99285 EMERGENCY DEPT VISIT HI MDM: CPT | Mod: 25 | Performed by: EMERGENCY MEDICINE

## 2021-09-07 PROCEDURE — 360N000075 HC SURGERY LEVEL 2, PER MIN: Performed by: OBSTETRICS & GYNECOLOGY

## 2021-09-07 PROCEDURE — 99284 EMERGENCY DEPT VISIT MOD MDM: CPT | Mod: 25 | Performed by: EMERGENCY MEDICINE

## 2021-09-07 PROCEDURE — 96361 HYDRATE IV INFUSION ADD-ON: CPT | Performed by: EMERGENCY MEDICINE

## 2021-09-07 PROCEDURE — C9803 HOPD COVID-19 SPEC COLLECT: HCPCS | Performed by: EMERGENCY MEDICINE

## 2021-09-07 PROCEDURE — 84702 CHORIONIC GONADOTROPIN TEST: CPT | Performed by: EMERGENCY MEDICINE

## 2021-09-07 PROCEDURE — 86923 COMPATIBILITY TEST ELECTRIC: CPT | Performed by: OBSTETRICS & GYNECOLOGY

## 2021-09-07 PROCEDURE — 76801 OB US < 14 WKS SINGLE FETUS: CPT

## 2021-09-07 PROCEDURE — U0005 INFEC AGEN DETEC AMPLI PROBE: HCPCS | Performed by: EMERGENCY MEDICINE

## 2021-09-07 PROCEDURE — 59812 TREATMENT OF MISCARRIAGE: CPT | Mod: GC | Performed by: OBSTETRICS & GYNECOLOGY

## 2021-09-07 PROCEDURE — 258N000003 HC RX IP 258 OP 636: Performed by: EMERGENCY MEDICINE

## 2021-09-07 PROCEDURE — 258N000003 HC RX IP 258 OP 636

## 2021-09-07 PROCEDURE — 250N000011 HC RX IP 250 OP 636

## 2021-09-07 PROCEDURE — 36415 COLL VENOUS BLD VENIPUNCTURE: CPT | Performed by: EMERGENCY MEDICINE

## 2021-09-07 PROCEDURE — 250N000009 HC RX 250

## 2021-09-07 PROCEDURE — 96374 THER/PROPH/DIAG INJ IV PUSH: CPT | Performed by: EMERGENCY MEDICINE

## 2021-09-07 PROCEDURE — 99284 EMERGENCY DEPT VISIT MOD MDM: CPT | Performed by: EMERGENCY MEDICINE

## 2021-09-07 PROCEDURE — 250N000009 HC RX 250: Performed by: OBSTETRICS & GYNECOLOGY

## 2021-09-07 PROCEDURE — 88305 TISSUE EXAM BY PATHOLOGIST: CPT | Mod: TC | Performed by: OBSTETRICS & GYNECOLOGY

## 2021-09-07 PROCEDURE — 250N000013 HC RX MED GY IP 250 OP 250 PS 637: Performed by: OBSTETRICS & GYNECOLOGY

## 2021-09-07 PROCEDURE — 99205 OFFICE O/P NEW HI 60 MIN: CPT | Mod: 57 | Performed by: OBSTETRICS & GYNECOLOGY

## 2021-09-07 PROCEDURE — 710N000012 HC RECOVERY PHASE 2, PER MINUTE: Performed by: OBSTETRICS & GYNECOLOGY

## 2021-09-07 PROCEDURE — 88305 TISSUE EXAM BY PATHOLOGIST: CPT | Mod: 26 | Performed by: PATHOLOGY

## 2021-09-07 PROCEDURE — 250N000011 HC RX IP 250 OP 636: Performed by: EMERGENCY MEDICINE

## 2021-09-07 PROCEDURE — 272N000001 HC OR GENERAL SUPPLY STERILE: Performed by: OBSTETRICS & GYNECOLOGY

## 2021-09-07 PROCEDURE — 86900 BLOOD TYPING SEROLOGIC ABO: CPT | Performed by: EMERGENCY MEDICINE

## 2021-09-07 RX ORDER — IBUPROFEN 800 MG/1
800 TABLET, FILM COATED ORAL EVERY 6 HOURS PRN
Qty: 30 TABLET | Refills: 0 | Status: SHIPPED | OUTPATIENT
Start: 2021-09-07 | End: 2021-09-28

## 2021-09-07 RX ORDER — LIDOCAINE HYDROCHLORIDE 10 MG/ML
INJECTION, SOLUTION INFILTRATION; PERINEURAL PRN
Status: DISCONTINUED | OUTPATIENT
Start: 2021-09-07 | End: 2021-09-07 | Stop reason: HOSPADM

## 2021-09-07 RX ORDER — ACETAMINOPHEN 325 MG/1
975 TABLET ORAL ONCE
Status: DISCONTINUED | OUTPATIENT
Start: 2021-09-07 | End: 2021-09-07 | Stop reason: HOSPADM

## 2021-09-07 RX ORDER — SODIUM CHLORIDE 9 MG/ML
INJECTION, SOLUTION INTRAVENOUS
Status: DISCONTINUED
Start: 2021-09-07 | End: 2021-09-07 | Stop reason: HOSPADM

## 2021-09-07 RX ORDER — LIDOCAINE HYDROCHLORIDE 20 MG/ML
INJECTION, SOLUTION INFILTRATION; PERINEURAL PRN
Status: DISCONTINUED | OUTPATIENT
Start: 2021-09-07 | End: 2021-09-07

## 2021-09-07 RX ORDER — ACETAMINOPHEN 325 MG/1
975 TABLET ORAL EVERY 6 HOURS PRN
Qty: 50 TABLET | Refills: 0 | Status: SHIPPED | OUTPATIENT
Start: 2021-09-07 | End: 2021-09-28

## 2021-09-07 RX ORDER — SODIUM CHLORIDE 9 MG/ML
INJECTION, SOLUTION INTRAVENOUS
Status: COMPLETED
Start: 2021-09-07 | End: 2021-09-07

## 2021-09-07 RX ORDER — KETOROLAC TROMETHAMINE 30 MG/ML
30 INJECTION, SOLUTION INTRAMUSCULAR; INTRAVENOUS ONCE
Status: COMPLETED | OUTPATIENT
Start: 2021-09-07 | End: 2021-09-07

## 2021-09-07 RX ORDER — PROPOFOL 10 MG/ML
INJECTION, EMULSION INTRAVENOUS PRN
Status: DISCONTINUED | OUTPATIENT
Start: 2021-09-07 | End: 2021-09-07

## 2021-09-07 RX ORDER — SODIUM CHLORIDE, SODIUM LACTATE, POTASSIUM CHLORIDE, CALCIUM CHLORIDE 600; 310; 30; 20 MG/100ML; MG/100ML; MG/100ML; MG/100ML
INJECTION, SOLUTION INTRAVENOUS CONTINUOUS
Status: DISCONTINUED | OUTPATIENT
Start: 2021-09-07 | End: 2021-09-07 | Stop reason: HOSPADM

## 2021-09-07 RX ORDER — ONDANSETRON 2 MG/ML
INJECTION INTRAMUSCULAR; INTRAVENOUS PRN
Status: DISCONTINUED | OUTPATIENT
Start: 2021-09-07 | End: 2021-09-07

## 2021-09-07 RX ORDER — PROPOFOL 10 MG/ML
INJECTION, EMULSION INTRAVENOUS CONTINUOUS PRN
Status: DISCONTINUED | OUTPATIENT
Start: 2021-09-07 | End: 2021-09-07

## 2021-09-07 RX ORDER — SODIUM CHLORIDE, SODIUM LACTATE, POTASSIUM CHLORIDE, CALCIUM CHLORIDE 600; 310; 30; 20 MG/100ML; MG/100ML; MG/100ML; MG/100ML
INJECTION, SOLUTION INTRAVENOUS CONTINUOUS PRN
Status: DISCONTINUED | OUTPATIENT
Start: 2021-09-07 | End: 2021-09-07

## 2021-09-07 RX ORDER — LIDOCAINE 40 MG/G
CREAM TOPICAL
Status: DISCONTINUED | OUTPATIENT
Start: 2021-09-07 | End: 2021-09-07 | Stop reason: HOSPADM

## 2021-09-07 RX ORDER — DOXYCYCLINE 100 MG/1
100 CAPSULE ORAL ONCE
Status: COMPLETED | OUTPATIENT
Start: 2021-09-07 | End: 2021-09-07

## 2021-09-07 RX ADMIN — SODIUM CHLORIDE 1000 ML: 9 INJECTION, SOLUTION INTRAVENOUS at 09:22

## 2021-09-07 RX ADMIN — PROPOFOL 85 MG: 10 INJECTION, EMULSION INTRAVENOUS at 14:26

## 2021-09-07 RX ADMIN — MIDAZOLAM 2 MG: 1 INJECTION INTRAMUSCULAR; INTRAVENOUS at 14:13

## 2021-09-07 RX ADMIN — LIDOCAINE HYDROCHLORIDE 80 MG: 20 INJECTION, SOLUTION INFILTRATION; PERINEURAL at 14:25

## 2021-09-07 RX ADMIN — PROPOFOL 150 MCG/KG/MIN: 10 INJECTION, EMULSION INTRAVENOUS at 14:25

## 2021-09-07 RX ADMIN — ONDANSETRON 4 MG: 2 INJECTION INTRAMUSCULAR; INTRAVENOUS at 14:38

## 2021-09-07 RX ADMIN — KETOROLAC TROMETHAMINE 30 MG: 30 INJECTION, SOLUTION INTRAMUSCULAR; INTRAVENOUS at 12:02

## 2021-09-07 RX ADMIN — SODIUM CHLORIDE, POTASSIUM CHLORIDE, SODIUM LACTATE AND CALCIUM CHLORIDE: 600; 310; 30; 20 INJECTION, SOLUTION INTRAVENOUS at 14:13

## 2021-09-07 RX ADMIN — DOXYCYCLINE 100 MG: 100 CAPSULE ORAL at 13:29

## 2021-09-07 RX ADMIN — SODIUM CHLORIDE 2000 ML: 9 INJECTION, SOLUTION INTRAVENOUS at 10:47

## 2021-09-07 ASSESSMENT — MIFFLIN-ST. JEOR: SCORE: 1509.63

## 2021-09-07 ASSESSMENT — ENCOUNTER SYMPTOMS
HEADACHES: 0
ABDOMINAL PAIN: 0
COLOR CHANGE: 0
FEVER: 0
NECK STIFFNESS: 0
ARTHRALGIAS: 0
DIFFICULTY URINATING: 0
SHORTNESS OF BREATH: 0
EYE REDNESS: 0
CONFUSION: 0

## 2021-09-07 NOTE — CONSULTS
[facility] Gynecology Consultation    Maynor Palomo MRN# [id number]   Age: 21 year old YOB: 2000     Date of Admission:  2021    Reason for consult: Vaginal bleeding with missed AB       Requesting physician: Dr. Garcia       Level of consult: Consult, place orders and assume complete care of the patient           Assessment and Plan:   Assessment:   Active Problems:    * No active hospital problems. *        Plan:   Dilation and curettage            Chief Complaint:   Active Problems:    * No active hospital problems. *       History is obtained from the patient         History of Present Illness:   Maynor Palomo is a 21 year old at  who is seen for consult regarding vaginal bleeding. Patient has been having significant vaginal bleeding for the past day that brought her into the ED today. She reports soaking through several towels with blood and seeing many clots. Also complaining of lower abdominal cramping/pain. Significant history includes prior Anxiety, Depression, ADHD, Migraines, COVID+ (21), and prior SAB.    Retained POC seen on ultrasound today.  On  she had us showing non viable IUP at 7+6 wks.      Current uls shows:     ULTRASOUND OBSTETRIC < 14 WEEKS SINGLE 2021 9:59 AM     HISTORY: Check for retained products.     FINDINGS: No evidence of intrauterine gestational sac, yolk sac or  fetal pole.     The right ovary measures 2.7 x 2.5 x 2.1 cm. The left ovary measures  3.8 x 2.8 x 1.7 cm. There is normal blood flow to the ovaries. No  adnexal masses. There is no free fluid in the pelvis. There is 2.7 x  1.2 x 1.6 cm hypoechoic area within the endometrial cavity, worrisome  for retained products of conception.                                                                      IMPRESSION:  1. No evidence of an intrauterine gestational sac, yolk sac or fetal  pole.  2. Hypoechoic material within the endometrial cavity,  worrisome for  retained products of conception.           Gynecologic History:   No STI.       Menses:   absent  Contraception:   None  Sexually transmitted disease history:  HPV positive -Other high risk types 21 (in care everywhere - [facility])         Obstetrical History:   Via Care everywhere Patient [facility] summary obstetrics history       2018  at 39w3d  female living child Dr. Small  2019 SAB at 8w0d  20  at 39w1d male child living Dr. Salinas         Past Medical History:   I have reviewed this patient's past medical history via care everywhere -noted in HPI          Past Surgical History:   I have reviewed this patient's past surgical history  Appendectomy, 2017   Neck cyst removal         Social History:     Social History     Tobacco Use     Smoking status: Current Some Day Smoker     Packs/day: 1.00     Types: Cigarettes     Last attempt to quit: 5/15/2020     Years since quittin.3     Smokeless tobacco: Never Used   Substance Use Topics     Alcohol use: Not Currently     Alcohol/week: 0.0 standard drinks     Comment: last-2 weeks ago             Family History:   The family history includes Depression in her father, maternal grandmother, and mother; Mental Illness in her father, paternal aunt, paternal grandfather, paternal grandmother, and paternal uncle; Substance Abuse in her father, maternal grandmother, mother, paternal aunt, paternal grandfather, paternal grandmother, and paternal uncle.    Family history   reviewed          Immunizations:     There is no immunization history on file for this patient.          Allergies:     Allergies   Allergen Reactions     Lactose GI Disturbance     Naproxen      Penicillins              Medications:     No current facility-administered medications for this encounter.     Current Outpatient Medications   Medication Sig     acetaminophen (TYLENOL) 325 MG tablet Take 1-2 tablets (325-650 mg) by mouth every 6 hours as  needed for mild pain, headaches or pain     albuterol (PROAIR HFA/PROVENTIL HFA/VENTOLIN HFA) 108 (90 Base) MCG/ACT inhaler Inhale 1-2 puffs into the lungs     cefdinir (OMNICEF) 300 MG capsule Take 1 capsule (300 mg) by mouth 2 times daily     guaiFENesin-dextromethorphan (ROBITUSSIN DM) 100-10 MG/5ML syrup Take 5 mLs by mouth 4 times daily as needed for cough     ibuprofen (ADVIL/MOTRIN) 600 MG tablet Take 1 tablet (600 mg) by mouth every 6 hours as needed for moderate pain     nitroFURantoin macrocrystal-monohydrate (MACROBID) 100 MG capsule Take 100 mg by mouth daily     oxyCODONE (ROXICODONE) 5 MG tablet Take 1-2 tablets (5-10 mg) by mouth every 6 hours as needed for pain     Prenatal Vit-Fe Fumarate-FA (PRENATAL MULTIVITAMIN W/IRON) 27-0.8 MG tablet Take 1 tablet by mouth daily     Facility-Administered Medications Ordered in Other Encounters   Medication     acetaminophen (TYLENOL) tablet 650 mg     ibuprofen (ADVIL,MOTRIN) tablet 400 mg             Review of Systems:   The Review of Systems is negative other than noted in the HPI          Physical Exam (Resident / Clinician):   Vitals were reviewed  Constitutional:   awake, alert, cooperative and mild distress     Abdomen:   tenderness noted diffusely     Genitounirinary:   External Genitalia:  General appearance; normal  Significant amount of bleeding from vaginal vault with several dime-quarter sized clot. One large size clot extracted by ED physician prior to our arrival.                Data:     Lab Results   Component Value Date    WBC 5.2 09/07/2021    HGB 10.5 (L) 09/07/2021    HCT 31.2 (L) 09/07/2021     09/07/2021     08/28/2021    POTASSIUM 3.4 08/28/2021    CHLORIDE 108 08/28/2021    CO2 25 08/28/2021    BUN 11 08/28/2021    CR 0.81 08/28/2021     (H) 08/28/2021    AST 13 08/28/2021    ALT 14 08/28/2021    ALKPHOS 44 08/28/2021    BILITOTAL 0.3 08/28/2021    INR 1.02 09/16/2019       Imaging results as noted above    A&P:  Maynor Palomo is a 21 year old at  who is seen for consult regarding vaginal bleeding. Patient has been having significant vaginal bleeding for the past day that brought her into the ED today. She reports soaking through several towels with blood and seeing many clots. Also complaining of lower abdominal cramping/pain. US imaging performed patient showed retained products of conception  from a miscarriage that was first noted on 20 at approximately 7w6d gestational age.     Discussed with patient at length plan for D&C to evacuate uterus. Patient expressed understanding of this plan and amenable to moving forward with heading to OR. [facility] when patient [facility] of [phone]. All questions were answered and addressed. This procedure has been fully reviewed with the patient and written informed consent for surgery and blood transfusion was obtained. Will move to OR as soon as we are able.    Thank you for this consult.  Please do not hesitate to contact us with concerns or questions.       Patient seen and discussed with Dr. Mcnamara.    Romario Hyde DO, MS  Obstetrics, Gynecology & Women's Health   Resident, PGY-1  2021 12:46 PM     [facility] staff:  Appreciate note by Dr. Hyde.  I have seen and examined the patient without the resident. I have reviewed, edited, and agree with the note.      Yessi Mcnamara MD, FACOG  2021  12:59 PM

## 2021-09-07 NOTE — ANESTHESIA CARE TRANSFER NOTE
Patient: Maynor Palomo    Procedure(s):  DILATION AND CURETTAGE, UTERUS, USING SUCTION    Diagnosis: Missed ab [O02.1]  Diagnosis Additional Information: No value filed.    Anesthesia Type:   General     Note:    Oropharynx: oropharynx clear of all foreign objects and spontaneously breathing  Level of Consciousness: drowsy  Oxygen Supplementation: room air    Independent Airway: airway patency satisfactory and stable  Dentition: dentition unchanged  Vital Signs Stable: post-procedure vital signs reviewed and stable  Report to RN Given: handoff report given  Patient transferred to: Phase II    Handoff Report: Identifed the Patient, Identified the Reponsible Provider, Reviewed the pertinent medical history, Discussed the surgical course, Reviewed Intra-OP anesthesia mangement and issues during anesthesia, Set expectations for post-procedure period and Allowed opportunity for questions and acknowledgement of understanding      Vitals:  Vitals Value Taken Time   BP 84/48 09/07/21 1453   Temp     Pulse 75 09/07/21 1453   Resp 12    SpO2 100 % 09/07/21 1456   Vitals shown include unvalidated device data.    Electronically Signed By: DAVID Boyer CRNA  September 7, 2021  2:57 PM

## 2021-09-07 NOTE — H&P (VIEW-ONLY)
"ED Provider Note  [facility]      History     Chief Complaint   Patient presents with     Vaginal Bleeding     HPI  Maynor Palomo is a 21 year old female  A  positive blood type who presents for evaluation of vaginal bleeding and cramping.  Patient states that she is approximately 10+ weeks pregnant and began vaginal bleeding approximately 10 hours prior to presentation.  Associated with bleeding, she has had significant cramping.  She states that\" chunks of blood are coming out of me soaking of towels\".  She denies other ongoing symptoms such as lightheadedness, dizziness, weakness, nausea, vomiting.  Of note, ultrasound performed on  revealed irregular gestational sac without cardiac activity and a gestational age of 7 weeks 6 days.    Past Medical History  Past Medical History:   Diagnosis Date     Constipation      Depressive disorder      Migraines      Past Surgical History:   Procedure Laterality Date     BIOPSY LYMPH NODE CERVICAL       LAPAROSCOPIC APPENDECTOMY N/A 2017    Procedure: LAPAROSCOPIC APPENDECTOMY;  Surgeon: Erick Casper MD;  Location: [facility] OR     TONSILLECTOMY, ADENOIDECTOMY WITH SHAVER, COMBINED       acetaminophen (TYLENOL) 325 MG tablet  albuterol (PROAIR HFA/PROVENTIL HFA/VENTOLIN HFA) 108 (90 Base) MCG/ACT inhaler  cefdinir (OMNICEF) 300 MG capsule  guaiFENesin-dextromethorphan (ROBITUSSIN DM) 100-10 MG/5ML syrup  ibuprofen (ADVIL/MOTRIN) 600 MG tablet  nitroFURantoin macrocrystal-monohydrate (MACROBID) 100 MG capsule  oxyCODONE (ROXICODONE) 5 MG tablet  Prenatal Vit-Fe Fumarate-FA (PRENATAL MULTIVITAMIN W/IRON) 27-0.8 MG tablet      Allergies   Allergen Reactions     Lactose GI Disturbance     Naproxen      Penicillins      Family History  Family History   Problem Relation Age of Onset     Depression Mother      Substance Abuse Mother         sober 6 yrs     Depression Father      Mental Illness Father         anger issues     " Substance Abuse Father         current user     Depression Maternal Grandmother      Substance Abuse Maternal Grandmother      Mental Illness Paternal Grandmother      Substance Abuse Paternal Grandmother      Mental Illness Paternal Grandfather      Substance Abuse Paternal Grandfather      Mental Illness Paternal Aunt      Substance Abuse Paternal Aunt      Mental Illness Paternal Uncle      Substance Abuse Paternal Uncle      Social History   Social History     Tobacco Use     Smoking status: Current Some Day Smoker     Packs/day: 1.00     Types: Cigarettes     Last attempt to quit: 5/15/2020     Years since quittin.3     Smokeless tobacco: Never Used   Substance Use Topics     Alcohol use: Not Currently     Alcohol/week: 0.0 standard drinks     Comment: last-2 weeks ago     Drug use: Not Currently     Types: Marijuana     Comment: THC-last 2 weeks ago      Past medical history, past surgical history, medications, allergies, family history, and social history were reviewed with the patient. No additional pertinent items.       Review of Systems   Constitutional: Negative for fever.   HENT: Negative for congestion.    Eyes: Negative for redness.   Respiratory: Negative for shortness of breath.    Cardiovascular: Negative for chest pain.   Gastrointestinal: Negative for abdominal pain.   Genitourinary: Negative for difficulty urinating.   Musculoskeletal: Negative for arthralgias and neck stiffness.   Skin: Negative for color change.   Neurological: Negative for headaches.   Psychiatric/Behavioral: Negative for confusion.     A complete review of systems was performed with pertinent positives and negatives noted in the HPI, and all other systems negative.    Physical Exam   BP: 105/61  Pulse: 80  Temp: 98.7  F (37.1  C)  Resp: 20  SpO2: 100 %  Physical Exam  Constitutional:       General: She is not in acute distress.     Appearance: She is not diaphoretic.   HENT:      Head: Atraumatic.      Mouth/Throat:       Pharynx: No oropharyngeal exudate.   Eyes:      General: No scleral icterus.     Pupils: Pupils are equal, round, and reactive to light.   Cardiovascular:      Heart sounds: Normal heart sounds.   Pulmonary:      Effort: No respiratory distress.      Breath sounds: Normal breath sounds.   Abdominal:      General: Bowel sounds are normal.      Palpations: Abdomen is soft.      Tenderness: There is no abdominal tenderness.   Genitourinary:     Vagina: Bleeding present.      Cervix: Cervical bleeding present.      Uterus: Enlarged.    Musculoskeletal:         General: No tenderness.   Skin:     General: Skin is warm.      Findings: No rash.         ED Course      Procedures       The medical record was reviewed and interpreted.  Current labs reviewed and interpreted.  Previous labs reviewed and interpreted.       Results for orders placed or performed during the hospital encounter of 09/07/21   US OB < 14 Weeks Single     Status: None    Narrative    ULTRASOUND OBSTETRIC < 14 WEEKS SINGLE September 7, 2021 9:59 AM    HISTORY: Check for retained products.    FINDINGS: No evidence of intrauterine gestational sac, yolk sac or  fetal pole.    The right ovary measures 2.7 x 2.5 x 2.1 cm. The left ovary measures  3.8 x 2.8 x 1.7 cm. There is normal blood flow to the ovaries. No  adnexal masses. There is no free fluid in the pelvis. There is 2.7 x  1.2 x 1.6 cm hypoechoic area within the endometrial cavity, worrisome  for retained products of conception.      Impression    IMPRESSION:  1. No evidence of an intrauterine gestational sac, yolk sac or fetal  pole.  2. Hypoechoic material within the endometrial cavity, worrisome for  retained products of conception.    KHADAR RIVERA MD         SYSTEM ID:  [id number]   CBC with platelets differential     Status: Abnormal    Narrative    The following orders were created for panel order CBC with platelets differential.  Procedure                               Abnormality          Status                     ---------                               -----------         ------                     CBC with platelets and d...[[id number]]  Abnormal            Final result                 Please view results for these tests on the individual orders.   HCG quantitative pregnancy (blood)     Status: Abnormal   Result Value Ref Range    hCG Quantitative 2,466 (H) 0 - 5 IU/L   CBC with platelets and differential     Status: Abnormal   Result Value Ref Range    WBC Count 5.2 4.0 - 11.0 10e3/uL    RBC Count 3.82 3.80 - 5.20 10e6/uL    Hemoglobin 10.5 (L) 11.7 - 15.7 g/dL    Hematocrit 31.2 (L) 35.0 - 47.0 %    MCV 82 78 - 100 fL    MCH 27.5 26.5 - 33.0 pg    MCHC 33.7 31.5 - 36.5 g/dL    RDW 14.0 10.0 - 15.0 %    Platelet Count 199 150 - 450 10e3/uL    % Neutrophils 62 %    % Lymphocytes 30 %    % Monocytes 7 %    % Eosinophils 0 %    % Basophils 1 %    % Immature Granulocytes 0 %    NRBCs per 100 WBC 0 <1 /100    Absolute Neutrophils 3.2 1.6 - 8.3 10e3/uL    Absolute Lymphocytes 1.5 0.8 - 5.3 10e3/uL    Absolute Monocytes 0.3 0.0 - 1.3 10e3/uL    Absolute Eosinophils 0.0 0.0 - 0.7 10e3/uL    Absolute Basophils 0.0 0.0 - 0.2 10e3/uL    Absolute Immature Granulocytes 0.0 <=0.0 10e3/uL    Absolute NRBCs 0.0 10e3/uL   Extra Red Top Tube     Status: None   Result Value Ref Range    Hold Specimen JI    Extra Tube     Status: None    Narrative    The following orders were created for panel order Extra Tube.  Procedure                               Abnormality         Status                     ---------                               -----------         ------                     Extra Red Top Tube[[id number]]                               Final result                 Please view results for these tests on the individual orders.     Medications   sodium chloride 0.9 % infusion (has no administration in time range)   ketorolac (TORADOL) injection 30 mg (has no administration in time range)   0.9% sodium chloride  BOLUS (2,000 mLs Intravenous New Bag 21 1637)        Assessments & Plan (with Medical Decision Making)   21-year-old female who presents for evaluation of bleeding in the setting of pregnancy.  Differential includes spontaneous miscarriage, incomplete , injury, retained products, ruptured ectopic, subchorionic hemorrhage.  Exam reveals patient to be normotensive with normal heart rate and significant vaginal bleeding.  Laboratories were obtained including CBC revealing hemoglobin down to 10.5.  Quantitative hCG was 2,466.  Ultrasound revealed hypoechoic material consistent with products of conception retained without evidence of intrauterine gestational sac or fetal pole.  The case was discussed at length with OB/gyne, please see separate note.  Patient will proceed to operating room for dilatation and curettage.    I have reviewed the nursing notes. I have reviewed the findings, diagnosis, plan and need for follow up with the patient.    New Prescriptions    No medications on file       Final diagnoses:   Incomplete        --  Pawan Garcia MD  [facility] EMERGENCY DEPARTMENT  2021     Pawan Garcia MD  21 [id number]

## 2021-09-07 NOTE — DISCHARGE SUMMARY
[facility]  Gynecology Discharge Summary    Admission Date:  21  Discharge Date:  21    Admission Attending: Yessi Mcnamara MD  Discharge Attending: Yessi Mcnamara MD    Admission Diagnosis:  - Incomplete     Discharge Diagnosis:  - Same, s/p procedure below    Procedures Performed:  - Suction D&C    Admission History:   Ms. Maynor Palomo is a 21 year old , who presented with heavy vaginal bleeding during the first trimester. On US, she was found to have retained products of conception. She was recommended the procedure above. The risks and benefits were discussed with the patient, and she agreed to proceed.    Operative Course:  She underwent suction D&C, which was uncomplicated. EBL was 10 cc. See operative report for further details.    Operative Findings:    6-week size anteverted, mobile uterus.  Cervix easily dilated to 7 mm.  Products of conception removed, with gestational sac, chorionic villi, and decidua visualized on gross inspection.    Hospital Course:  Her postoperative course was uncomplicated. She was discharged home on POD#0 after she was tolerated oral intake and pain was controlled on PO medications.     Discharge Plans:  - The patient was discharged to home  - PO Activity:   - Nothing in the vagina for 2 weeks  - She was instructed to call clinic or return to ED if she has any of the following:    - Temperature greater than 100.4F   - Pain not controlled by pain medications   - Uncontrolled nausea/vomiting   - Foul-smelling vaginal discharge   - Vaginal bleeding soaking 1 pad per hour for 2 hours in a row  - She was instructed to follow-up with her OB provider in 2-3 weeks    - Discharge medications include:     Review of your medicines      CONTINUE these medicines which may have CHANGED, or have new prescriptions. If we are uncertain of the size of tablets/capsules you have at home, strength may be listed as something that  might have changed.      Dose / Directions   acetaminophen 325 MG tablet  Commonly known as: TYLENOL  This may have changed:     how much to take    reasons to take this  Used for: Incomplete       Dose: 975 mg  Take 3 tablets (975 mg) by mouth every 6 hours as needed for mild pain  Quantity: 50 tablet  Refills: 0     ibuprofen 800 MG tablet  Commonly known as: ADVIL/MOTRIN  This may have changed:     medication strength    how much to take    reasons to take this  Used for: Incomplete       Dose: 800 mg  Take 1 tablet (800 mg) by mouth every 6 hours as needed for other (mild and/or inflammatory pain)  Quantity: 30 tablet  Refills: 0        CONTINUE these medicines which have NOT CHANGED      Dose / Directions   albuterol 108 (90 Base) MCG/ACT inhaler  Commonly known as: PROAIR HFA/PROVENTIL HFA/VENTOLIN HFA      Dose: 1-2 puff  Inhale 1-2 puffs into the lungs  Refills: 0     cefdinir 300 MG capsule  Commonly known as: OMNICEF      Dose: 300 mg  Take 1 capsule (300 mg) by mouth 2 times daily  Quantity: 14 capsule  Refills: 0     guaiFENesin-dextromethorphan 100-10 MG/5ML syrup  Commonly known as: ROBITUSSIN DM      Dose: 5 mL  Take 5 mLs by mouth 4 times daily as needed for cough  Quantity: 118 mL  Refills: 0     nitroFURantoin macrocrystal-monohydrate 100 MG capsule  Commonly known as: MACROBID  Indication: Urinary Tract Infection      Dose: 100 mg  Take 100 mg by mouth daily  Refills: 0     prenatal multivitamin w/iron 27-0.8 MG tablet      Dose: 1 tablet  Take 1 tablet by mouth daily  Refills: 0        STOP taking    oxyCODONE 5 MG tablet  Commonly known as: ROXICODONE              Where to get your medicines      These medications were sent to [facility] [place], [place] - [address]  [address] [address]    Phone: [phone]     acetaminophen 325 MG tablet    ibuprofen 800 MG tablet       Kaila Wilson MD  Obstetrics and Gynecology,  PGY-4  9/7/2021 3:00 PM    [facility] staff:  Appreciate note by Dr. Wilson.  I have seen and examined the patient without the resident. I have reviewed, edited, and agree with the note.        Yessi Mcnamara MD, FACOG

## 2021-09-07 NOTE — ANESTHESIA POSTPROCEDURE EVALUATION
Patient: Maynor Palomo    Procedure(s):  DILATION AND CURETTAGE, UTERUS, USING SUCTION    Diagnosis:Missed ab [O02.1]  Diagnosis Additional Information: No value filed.    Anesthesia Type:  General    Note:  Disposition: Outpatient   Postop Pain Control: Uneventful            Sign Out: Well controlled pain   PONV: No   Neuro/Psych: Uneventful            Sign Out: Acceptable/Baseline neuro status   Airway/Respiratory: Uneventful            Sign Out: Acceptable/Baseline resp. status   CV/Hemodynamics: Uneventful            Sign Out: Acceptable CV status; No obvious hypovolemia; No obvious fluid overload   Other NRE: NONE   DID A NON-ROUTINE EVENT OCCUR? No    Event details/Postop Comments:  - Uneventful, ready for discharge           Last vitals:  Vitals Value Taken Time   /72 09/07/21 1530   Temp 36.6  C (97.9  F) 09/07/21 1453   Pulse 55 09/07/21 1530   Resp     SpO2 85 % 09/07/21 1557   Vitals shown include unvalidated device data.    Electronically Signed By: Arslan Loera MD  September 7, 2021  4:13 PM

## 2021-09-07 NOTE — ED PROVIDER NOTES
"ED Provider Note  [facility]      History     Chief Complaint   Patient presents with     Vaginal Bleeding     HPI  Maynor Palomo is a 21 year old female  A  positive blood type who presents for evaluation of vaginal bleeding and cramping.  Patient states that she is approximately 10+ weeks pregnant and began vaginal bleeding approximately 10 hours prior to presentation.  Associated with bleeding, she has had significant cramping.  She states that\" chunks of blood are coming out of me soaking of towels\".  She denies other ongoing symptoms such as lightheadedness, dizziness, weakness, nausea, vomiting.  Of note, ultrasound performed on  revealed irregular gestational sac without cardiac activity and a gestational age of 7 weeks 6 days.    Past Medical History  Past Medical History:   Diagnosis Date     Constipation      Depressive disorder      Migraines      Past Surgical History:   Procedure Laterality Date     BIOPSY LYMPH NODE CERVICAL       LAPAROSCOPIC APPENDECTOMY N/A 2017    Procedure: LAPAROSCOPIC APPENDECTOMY;  Surgeon: Erick Casper MD;  Location: [facility] OR     TONSILLECTOMY, ADENOIDECTOMY WITH SHAVER, COMBINED       acetaminophen (TYLENOL) 325 MG tablet  albuterol (PROAIR HFA/PROVENTIL HFA/VENTOLIN HFA) 108 (90 Base) MCG/ACT inhaler  cefdinir (OMNICEF) 300 MG capsule  guaiFENesin-dextromethorphan (ROBITUSSIN DM) 100-10 MG/5ML syrup  ibuprofen (ADVIL/MOTRIN) 600 MG tablet  nitroFURantoin macrocrystal-monohydrate (MACROBID) 100 MG capsule  oxyCODONE (ROXICODONE) 5 MG tablet  Prenatal Vit-Fe Fumarate-FA (PRENATAL MULTIVITAMIN W/IRON) 27-0.8 MG tablet      Allergies   Allergen Reactions     Lactose GI Disturbance     Naproxen      Penicillins      Family History  Family History   Problem Relation Age of Onset     Depression Mother      Substance Abuse Mother         sober 6 yrs     Depression Father      Mental Illness Father         anger issues     " Substance Abuse Father         current user     Depression Maternal Grandmother      Substance Abuse Maternal Grandmother      Mental Illness Paternal Grandmother      Substance Abuse Paternal Grandmother      Mental Illness Paternal Grandfather      Substance Abuse Paternal Grandfather      Mental Illness Paternal Aunt      Substance Abuse Paternal Aunt      Mental Illness Paternal Uncle      Substance Abuse Paternal Uncle      Social History   Social History     Tobacco Use     Smoking status: Current Some Day Smoker     Packs/day: 1.00     Types: Cigarettes     Last attempt to quit: 5/15/2020     Years since quittin.3     Smokeless tobacco: Never Used   Substance Use Topics     Alcohol use: Not Currently     Alcohol/week: 0.0 standard drinks     Comment: last-2 weeks ago     Drug use: Not Currently     Types: Marijuana     Comment: THC-last 2 weeks ago      Past medical history, past surgical history, medications, allergies, family history, and social history were reviewed with the patient. No additional pertinent items.       Review of Systems   Constitutional: Negative for fever.   HENT: Negative for congestion.    Eyes: Negative for redness.   Respiratory: Negative for shortness of breath.    Cardiovascular: Negative for chest pain.   Gastrointestinal: Negative for abdominal pain.   Genitourinary: Negative for difficulty urinating.   Musculoskeletal: Negative for arthralgias and neck stiffness.   Skin: Negative for color change.   Neurological: Negative for headaches.   Psychiatric/Behavioral: Negative for confusion.     A complete review of systems was performed with pertinent positives and negatives noted in the HPI, and all other systems negative.    Physical Exam   BP: 105/61  Pulse: 80  Temp: 98.7  F (37.1  C)  Resp: 20  SpO2: 100 %  Physical Exam  Constitutional:       General: She is not in acute distress.     Appearance: She is not diaphoretic.   HENT:      Head: Atraumatic.      Mouth/Throat:       Pharynx: No oropharyngeal exudate.   Eyes:      General: No scleral icterus.     Pupils: Pupils are equal, round, and reactive to light.   Cardiovascular:      Heart sounds: Normal heart sounds.   Pulmonary:      Effort: No respiratory distress.      Breath sounds: Normal breath sounds.   Abdominal:      General: Bowel sounds are normal.      Palpations: Abdomen is soft.      Tenderness: There is no abdominal tenderness.   Genitourinary:     Vagina: Bleeding present.      Cervix: Cervical bleeding present.      Uterus: Enlarged.    Musculoskeletal:         General: No tenderness.   Skin:     General: Skin is warm.      Findings: No rash.         ED Course      Procedures       The medical record was reviewed and interpreted.  Current labs reviewed and interpreted.  Previous labs reviewed and interpreted.       Results for orders placed or performed during the hospital encounter of 09/07/21   US OB < 14 Weeks Single     Status: None    Narrative    ULTRASOUND OBSTETRIC < 14 WEEKS SINGLE September 7, 2021 9:59 AM    HISTORY: Check for retained products.    FINDINGS: No evidence of intrauterine gestational sac, yolk sac or  fetal pole.    The right ovary measures 2.7 x 2.5 x 2.1 cm. The left ovary measures  3.8 x 2.8 x 1.7 cm. There is normal blood flow to the ovaries. No  adnexal masses. There is no free fluid in the pelvis. There is 2.7 x  1.2 x 1.6 cm hypoechoic area within the endometrial cavity, worrisome  for retained products of conception.      Impression    IMPRESSION:  1. No evidence of an intrauterine gestational sac, yolk sac or fetal  pole.  2. Hypoechoic material within the endometrial cavity, worrisome for  retained products of conception.    KHADAR RIVERA MD         SYSTEM ID:  [id number]   CBC with platelets differential     Status: Abnormal    Narrative    The following orders were created for panel order CBC with platelets differential.  Procedure                               Abnormality          Status                     ---------                               -----------         ------                     CBC with platelets and d...[[id number]]  Abnormal            Final result                 Please view results for these tests on the individual orders.   HCG quantitative pregnancy (blood)     Status: Abnormal   Result Value Ref Range    hCG Quantitative 2,466 (H) 0 - 5 IU/L   CBC with platelets and differential     Status: Abnormal   Result Value Ref Range    WBC Count 5.2 4.0 - 11.0 10e3/uL    RBC Count 3.82 3.80 - 5.20 10e6/uL    Hemoglobin 10.5 (L) 11.7 - 15.7 g/dL    Hematocrit 31.2 (L) 35.0 - 47.0 %    MCV 82 78 - 100 fL    MCH 27.5 26.5 - 33.0 pg    MCHC 33.7 31.5 - 36.5 g/dL    RDW 14.0 10.0 - 15.0 %    Platelet Count 199 150 - 450 10e3/uL    % Neutrophils 62 %    % Lymphocytes 30 %    % Monocytes 7 %    % Eosinophils 0 %    % Basophils 1 %    % Immature Granulocytes 0 %    NRBCs per 100 WBC 0 <1 /100    Absolute Neutrophils 3.2 1.6 - 8.3 10e3/uL    Absolute Lymphocytes 1.5 0.8 - 5.3 10e3/uL    Absolute Monocytes 0.3 0.0 - 1.3 10e3/uL    Absolute Eosinophils 0.0 0.0 - 0.7 10e3/uL    Absolute Basophils 0.0 0.0 - 0.2 10e3/uL    Absolute Immature Granulocytes 0.0 <=0.0 10e3/uL    Absolute NRBCs 0.0 10e3/uL   Extra Red Top Tube     Status: None   Result Value Ref Range    Hold Specimen JI    Extra Tube     Status: None    Narrative    The following orders were created for panel order Extra Tube.  Procedure                               Abnormality         Status                     ---------                               -----------         ------                     Extra Red Top Tube[[id number]]                               Final result                 Please view results for these tests on the individual orders.     Medications   sodium chloride 0.9 % infusion (has no administration in time range)   ketorolac (TORADOL) injection 30 mg (has no administration in time range)   0.9% sodium chloride  BOLUS (2,000 mLs Intravenous New Bag 21 4154)        Assessments & Plan (with Medical Decision Making)   21-year-old female who presents for evaluation of bleeding in the setting of pregnancy.  Differential includes spontaneous miscarriage, incomplete , injury, retained products, ruptured ectopic, subchorionic hemorrhage.  Exam reveals patient to be normotensive with normal heart rate and significant vaginal bleeding.  Laboratories were obtained including CBC revealing hemoglobin down to 10.5.  Quantitative hCG was 2,466.  Ultrasound revealed hypoechoic material consistent with products of conception retained without evidence of intrauterine gestational sac or fetal pole.  The case was discussed at length with OB/gyne, please see separate note.  Patient will proceed to operating room for dilatation and curettage.    I have reviewed the nursing notes. I have reviewed the findings, diagnosis, plan and need for follow up with the patient.    New Prescriptions    No medications on file       Final diagnoses:   Incomplete        --  Pawan Garcia MD  [facility] EMERGENCY DEPARTMENT  2021     Pawan Garcia MD  21 [id number]

## 2021-09-07 NOTE — OP NOTE
[facility]  Full Operative Note    Date of Service: 2021    Surgeon: Yessi Mcnamara MD  Assistants: Kaila Wilson MD, PGY-4      Preop Dx: Incomplete   Postop Dx: Same  Procedure: EUA, Cervical dilation and suction curettage     Anesthesia: MAC, local  EBL:  10 cc  Specimens: Products of conception  Complications: None apparent    Indications: Ms. Maynor Palomo is a 21 year old , who presented with heavy vaginal bleeding during the first trimester. On US, she was found to have retained products of conception. She was recommended the procedure above. The risks and benefits were discussed with the patient, and she agreed to proceed.    Findings: 6-week size anteverted, mobile uterus.  Cervix easily dilated to 7 mm.  Products of conception removed, with gestational sac, chorionic villi, and decidua visualized on gross inspection.    Procedure Details:  The patient was brought to the OR where adequate MAC was administered.  Exam under anesthesia revealed the findings noted above.  The patient was prepped and draped in the usual sterile fashion.  A sterile speculum was placed.  The anterior lip of the cervix was then grasped with a single tooth tenaculum.  A paracervical block was performed with a total of 20 cc of the 1% lidocaine injected at 4 and 8 o'clock in the cervicovaginal junction. The cervix was dilated easily to a 7 mm dilator.  A 7 mm rigid suction curette was placed easily.  Suction was set to 60-80 mmHg, and was used to evacuate the uterus of the products of conception, grossly visualized through the tubing. The tenaculum was then removed, and the tenaculum site was noted to be hemostatic.  The sterile speculum was removed.     The sponge, needle, and instrument counts were correct.  The patient tolerated the procedure well and was transferred to recovery in stable condition.  Dr. Mcnamara was present and scrubbed for the entirety of the  procedure.    Kaila Wilson MD  Obstetrics and Gynecology, PGY-4  9/7/2021 2:47 PM    Staff:  I was scrubbed and present for entire case and agree w/ above note.    Yessi Mcnamara MD

## 2021-09-07 NOTE — ANESTHESIA PREPROCEDURE EVALUATION
Anesthesia Pre-Procedure Evaluation    Patient: Maynor Palomo   MRN: [id number] : 2000        Preoperative Diagnosis: Missed ab [O02.1]   Procedure : Procedure(s):  DILATION AND CURETTAGE, UTERUS, USING SUCTION     Past Medical History:   Diagnosis Date     Constipation      Depressive disorder      Migraines       Past Surgical History:   Procedure Laterality Date     BIOPSY LYMPH NODE CERVICAL       LAPAROSCOPIC APPENDECTOMY N/A 2017    Procedure: LAPAROSCOPIC APPENDECTOMY;  Surgeon: Erick Casper MD;  Location: [facility] OR     TONSILLECTOMY, ADENOIDECTOMY WITH SHAVER, COMBINED        Allergies   Allergen Reactions     Lactose GI Disturbance     Naproxen      Penicillins       Social History     Tobacco Use     Smoking status: Current Some Day Smoker     Packs/day: 1.00     Types: Cigarettes     Last attempt to quit: 5/15/2020     Years since quittin.3     Smokeless tobacco: Never Used   Substance Use Topics     Alcohol use: Not Currently     Alcohol/week: 0.0 standard drinks     Comment: last-2 weeks ago      Wt Readings from Last 1 Encounters:   21 71.7 kg (158 lb 1.1 oz)        Anesthesia Evaluation   Pt has had prior anesthetic. Type: General and Regional.    No history of anesthetic complications       ROS/MED HX  ENT/Pulmonary:  - neg pulmonary ROS     Neurologic:  - neg neurologic ROS     Cardiovascular:  - neg cardiovascular ROS  (-) murmur   METS/Exercise Tolerance: >4 METS    Hematologic:  - neg hematologic  ROS     Musculoskeletal:  - neg musculoskeletal ROS     GI/Hepatic: Comment:   - one episode of vomiting earlier today, but currently no nausea, did not eat today    (+) appendicitis (h/o. s/p appendectomy ),     Renal/Genitourinary:  - neg Renal ROS     Endo:  - neg endo ROS     Psychiatric/Substance Use:     (+) psychiatric history depression (PTSD, sucidal ideation)     Infectious Disease:  - neg infectious disease ROS     Malignancy:  - neg malignancy ROS     Other:       (+) Possibly pregnant (Missed ), ,         Physical Exam    Airway        Mallampati: I   TM distance: > 3 FB   Neck ROM: full   Mouth opening: > 3 cm    Respiratory Devices and Support         Dental  no notable dental history         Cardiovascular          Rhythm and rate: regular and normal (-) no murmur    Pulmonary           breath sounds clear to auscultation           OUTSIDE LABS:  CBC:   Lab Results   Component Value Date    WBC 5.2 2021    WBC 7.5 2021    HGB 10.5 (L) 2021    HGB 12.0 2021    HCT 31.2 (L) 2021    HCT 35.3 2021     2021     2021     BMP:   Lab Results   Component Value Date     2021     2020    POTASSIUM 3.4 2021    POTASSIUM 3.4 2020    CHLORIDE 108 2021    CHLORIDE 106 2020    CO2 25 2021    CO2 26 2020    BUN 11 2021    BUN 7 2020    CR 0.81 2021    CR 0.72 2020     (H) 2021    GLC 55 (L) 2020     COAGS:   Lab Results   Component Value Date    PTT 31 2019    INR 1.02 2019     POC:   Lab Results   Component Value Date    BGM 73 2020    HCG Positive (A) 2021     HEPATIC:   Lab Results   Component Value Date    ALBUMIN 3.5 2021    PROTTOTAL 7.1 2021    ALT 14 2021    AST 13 2021    ALKPHOS 44 2021    BILITOTAL 0.3 2021     OTHER:   Lab Results   Component Value Date    LACT 1.0 2017    JESSE 8.8 2021    LIPASE 147 2017    TSH 0.47 10/18/2016       Anesthesia Plan    ASA Status:  2   NPO Status:  NPO Appropriate    Anesthesia Type: General.     - Airway: Native airway   Induction: Intravenous.   Maintenance: TIVA.        Consents    Anesthesia Plan(s) and associated risks, benefits, and realistic alternatives discussed. Questions answered and patient/representative(s) expressed understanding.     - Discussed with:  Patient      - Extended  Intubation/Ventilatory Support Discussed: No.      - Patient is DNR/DNI Status: No    Use of blood products discussed: No .     Postoperative Care    Pain management: IV analgesics.   PONV prophylaxis: Ondansetron (or other 5HT-3), Background Propofol Infusion     Comments:    Discussed common and potentially harmful risks for General Anesthesia, Native Airway.   These risks include, but were not limited to: Conversion to secured airway, Sore throat, Airway injury, Dental injury, Aspiration, Respiratory issues (Bronchospasm, Laryngospasm, Desaturation), Hemodynamic issues (Arrhythmia, Hypotension, Ischemia), Potential long term consequences of respiratory and hemodynamic issues, PONV, Emergence delirium/agitation  Risks of invasive procedures were not discussed: N/A    All questions were answered.            Arslan Loera MD

## 2021-09-07 NOTE — DISCHARGE INSTRUCTIONS
Same-Day Surgery   Adult Discharge Orders & Instructions     For 24 hours after surgery:  1. Get plenty of rest.  A responsible adult must stay with you for at least 24 hours after you leave the hospital.   2. Pain medication can slow your reflexes. Do not drive or use heavy equipment.  If you have weakness or tingling, don't drive or use heavy equipment until this feeling goes away.  3. Mixing alcohol and pain medication can cause dizziness and slow your breathing. It can even be fatal. Do not drink alcohol while taking pain medication.  4. Avoid strenuous or risky activities.  Ask for help when climbing stairs.   5. You may feel lightheaded.  If so, sit for a few minutes before standing.  Have someone help you get up.   6. If you have nausea (feel sick to your stomach), drink only clear liquids such as apple juice, ginger ale, broth or 7-Up.  Rest may also help.  Be sure to drink enough fluids.  Move to a regular diet as you feel able. Take pain medications with a small amount of solid food, such as toast or crackers, to avoid nausea.   7. A slight fever is normal. Call the doctor if your fever is over 100 F (37.7 C) (taken under the tongue) or lasts longer than 24 hours.  8. You may have a dry mouth, muscle aches, trouble sleeping or a sore throat.  These symptoms should go away after 24 hours.  9. Do not make important or legal decisions.   Pain Management:      1. Take pain medication (if prescribed) for pain as directed by your physician.        2. WARNING: If the pain medication you have been prescribed contains Tylenol  (acetaminophen), DO NOT take additional doses of Tylenol (acetaminophen).     Call your doctor for any of the followin.  Signs of infection (fever, growing tenderness at the surgery site, severe pain, a large amount of drainage or bleeding, foul-smelling drainage, redness, swelling).    2.  It has been over 8 to 10 hours since surgery and you are still not able to urinate (pee).    3.   Headache for over 24 hours.    4.  Numbness, tingling or weakness the day after surgery (if you had spinal anesthesia).  To contact a doctor, call _____________________________________ or:      [phone] and ask for the Resident On Call for:          __________________________________________ (answered 24 hours a day)      Emergency Department:  [place] [facility]: [phone]  [place] [facility]: [phone]               Rev. 10/2014

## 2021-09-09 LAB
PATH REPORT.COMMENTS IMP SPEC: NORMAL
PATH REPORT.COMMENTS IMP SPEC: NORMAL
PATH REPORT.FINAL DX SPEC: NORMAL
PATH REPORT.GROSS SPEC: NORMAL
PATH REPORT.MICROSCOPIC SPEC OTHER STN: NORMAL
PATH REPORT.RELEVANT HX SPEC: NORMAL
PHOTO IMAGE: NORMAL

## 2021-09-27 NOTE — PATIENT INSTRUCTIONS
TRIAL over the counter PSEUDOEPHEDRINE (during the day) AND AFRIN (Oxymetazolin) NASAL SPRAY  (for 3 days maximum) FOR 3 DAYS.        At [facility], we strive to deliver an exceptional experience to you, every time we see you. If you receive a survey, please complete it as we do value your feedback.  If you have MyChart, you can expect to receive results automatically within 24 hours of their completion.  Your provider will send a note interpreting your results as well.   If you do not have MyChart, you should receive your results in about a week by mail.    Your care team:                            Family Medicine Internal Medicine   MD John Nieves, MD Kenneth Louie MD Katya Belousova, PAEDDIE Doherty, APRN JEROME Scott MD Pediatrics   Nestor Calhoun, AR Avila, MD Amna Cao APRN CNP   MD Radha Dennis MD Deborah Mielke, MD Kim Thein, APRN CNP      Clinic hours: Monday - Thursday 7 am-6 pm; Fridays 7 am-5 pm.   Urgent care: Monday - Friday 10 am- 8 pm; Saturday and Sunday 9 am-5 pm.    Clinic: [phone]       [facility]: Monday - Thursday 8 am - 7 pm; Friday 8 am - 6 pm  [facility]: [phone]     Use www.oncare.org for 24/7 diagnosis and treatment of dozens of conditions.    [employer] is for any [facility] looking for free support to quit smoking, vaping or chewing.   [employer] will offer many quit support options and resources so [place] residents can continue to find the way to quit that works best for them.   Free support includes personalized coaching, email and text support, educational materials, and quit medication (nicotine patches, gum or lozenges) delivered by mail.     Contact [employer] at 3-888-QUIT-NOW or online at [employer] to receive support on your quit  journey.    How to Quit Smoking  Smoking is one of the hardest habits to break. About half of all people who have ever smoked have been able to quit. Most people who still smoke want to quit. Here are some of the best ways to stop smoking.    Keep trying  Most smokers make many attempts at quitting before they are successful. It s important not to give up.  Go cold turkey  Most former smokers quit cold turkey (all at once). Trying to cut back gradually doesn't seem to work as well, perhaps because it continues the smoking habit. Also, it is possible to inhale more while smoking fewer cigarettes. This results in the same amount of nicotine in your body.  Get support  Support programs can be a big help, especially for heavy smokers. These groups offer lectures, ways to change behavior, and peer support. Here are some ways to find a support program:    Free national quitline: 800-QUIT-NOW (729-860-2560).    [facility] quit-smoking programs.    American Lung Association: (805.172.3506).    American Cancer Society (758-528-5153).  Support at home is important too. Nonsmokers can offer praise and encouragement. If the smoker in your life finds it hard to quit, encourage them to keep trying.  Over-the-counter medicines  Nicotine replacement therapy may make quitting easier. Certain aids, such as the nicotine patch, gum, and lozenges, are available without a prescription. It is best to use these under a doctor s care, though. The skin patch provides a steady supply of nicotine. Nicotine gum and lozenges give temporary bursts of low levels of nicotine. Both methods reduce the craving for cigarettes. Warning: If you have nausea, vomiting, dizziness, weakness, or a fast heartbeat, stop using these products and see your doctor.  Prescription medicines  After reviewing your smoking patterns and past attempts to quit, your doctor may offer a prescription medicine such as bupropion, varenicline, a nicotine inhaler, or nasal spray.  "Each has advantages and side effects. Your doctor can review these with you.  Health benefits of quitting  The benefits of quitting start right away and keep improving the longer you go without smoking. These benefits occur at any age.  So whether you are 17 or 70, quitting is a good decision. Some of the benefits include:    20 minutes: Blood pressure and pulse return to normal.    8 hours: Oxygen levels return to normal.    2 days: Ability to smell and taste begin to improve as damaged nerves regrow.    2 to 3 weeks: Circulation and lung function improve.    1 to 9 months: Coughing, congestion, and shortness of breath decrease; tiredness decreases.    1 year: Risk of heart attack decreases by half.    5 years: Risk of lung cancer decreases by half; risk of stroke becomes the same as a nonsmoker s.  For more on how to quit smoking, try these online resources:     Picolight.gov    \"Clearing the Air\" booklet from the National Cancer [place]: smokefree.gov/sites/default/files/pdf/clearing-the-air-accessible.pdf  Date Last Reviewed: 3/1/2017    9034-7020 [employer]. [address] [place] [address]. All rights reserved. This information is not intended as a substitute for professional medical care. Always follow your healthcare professional's instructions.           [employer] is for any [place] looking for free support to quit smoking, vaping or chewing.   [employer] will offer many quit support options and resources so [place] residents can continue to find the way to quit that works best for them.   Free support includes personalized coaching, email and text support, educational materials, and quit medication (nicotine patches, gum or lozenges) delivered by mail.     Contact [employer] at 1-800-QUIT-NOW or online at [employer] to receive support on your quit journey.    Patient Education     Allergic Rhinitis  Allergic rhinitis is an allergic reaction that affects the " nose, and often the eyes. It s often known as nasal allergies. Nasal allergies are often due to things in the environment that are breathed in. Depending what you are sensitive to, nasal allergies may occur only during certain seasons, or they may occur year round. Common indoor allergens include house dust mites, mold, cockroaches, and pet dander. Outdoor allergens include pollen from trees, grasses, and weeds.    Symptoms include a drippy, stuffy, and itchy nose. They also include sneezing and red and itchy eyes. You may feel tired more often. Severe allergies may also affect your breathing and trigger a condition called asthma.    Tests can be done to see what allergens are affecting you. You may be referred to an allergy specialist for testing and further evaluation.   Home care  Your healthcare provider may prescribe medicines to help relieve allergy symptoms. These may include oral medicines, nasal sprays, or eye drops.   Ask your provider for advice on how to stay away from substances that you are allergic to. Below are a few tips for each type of allergen.   Pet dander:    Do not have pets with fur and feathers.    If you have a pet, keep it out of your bedroom and off upholstered furniture.  Pollen:    When pollen counts are high, keep windows of your car and home closed. If possible, use an air conditioner instead.    Wear a filter mask when mowing or doing yard work.  House dust mites:    Wash bedding every week in warm water and detergent and dry on a hot setting.    Cover the mattress, box spring, and pillows with allergy covers.     If possible, sleep in a room with no carpet, curtains, or upholstered furniture.  Cockroaches:    Store food in sealed containers.    Remove garbage from the home promptly.    Fix water leaks.  Mold:    Keep humidity low by using a dehumidifier or air conditioner. Keep the dehumidifier and air conditioner clean and free of mold.    Clean moldy areas with bleach and water.  Don't mix bleach with other .  In general:    Vacuum once or twice a week. If possible, use a vacuum with a high-efficiency particulate air (HEPA) filter.    Don't smoke. Stay away from cigarette smoke. Cigarette smoke is an irritant that can make symptoms worse.  Follow-up care  Follow up as advised by the healthcare provider or our staff. If you were referred to an allergy specialist, make this appointment promptly.   When to seek medical advice  Call your healthcare provider or get medical care right away if the following occur:     Coughing    Fever of 100.4 F (38 C) or higher, or as directed by your healthcare provider    Raised red bumps (hives)    Continuing symptoms, new symptoms, or worsening symptoms  Call 911  Call 911 if you have:     Trouble breathing    Severe swelling of the face or severe itching of the eyes or mouth    Wheezing or shortness of breath    Chest tightness    Dizziness or lightheadedness    Feeling of doom    Stomach pain, bloating, vomiting, or diarrhea  [employer] last reviewed this educational content on 10/1/2019    9196-0962 [employer]. All rights reserved. This information is not intended as a substitute for professional medical care. Always follow your healthcare professional's instructions.

## 2021-09-27 NOTE — PROGRESS NOTES
SUBJECTIVE:   CC: Maynor Palomo is an 21 year old woman who presents for preventive health visit.      [facility]      Healthy Habits:     Getting at least 3 servings of Calcium per day:  Yes    Bi-annual eye exam:  Yes    Dental care twice a year:  NO    Sleep apnea or symptoms of sleep apnea:  None    Diet:  Regular (no restrictions)    Frequency of exercise:  2-3 days/week    Duration of exercise:  Greater than 60 minutes    Taking medications regularly:  No    Medication side effects:  Not applicable    PHQ-2 Total Score: 0    Additional concerns today:  No        Would liek STD screenings.  Is having some discharge, no itching    Has been having sinus congestion.  Has been using AC and fans, also hx seasonal allergies.   2 days.  usually waits it out as generally clears up in a coupel days.       Review of Systems   Constitutional: Negative for chills and fever.   HENT: Positive for congestion. Negative for ear pain, hearing loss and sore throat.    Eyes: Negative for pain and visual disturbance.   Respiratory: Negative for cough and shortness of breath.    Cardiovascular: Negative for chest pain, palpitations and peripheral edema.   Gastrointestinal: Negative for abdominal pain, constipation, diarrhea, heartburn, hematochezia and nausea.   Breasts:  Negative for tenderness, breast mass and discharge.   Genitourinary: Positive for vaginal discharge. Negative for dysuria, frequency, genital sores, hematuria, pelvic pain, urgency and vaginal bleeding.   Musculoskeletal: Negative for arthralgias, joint swelling and myalgias.   Skin: Negative for rash.   Neurological: Negative for dizziness, weakness, headaches and paresthesias.   Psychiatric/Behavioral: Negative for mood changes. The patient is not nervous/anxious.        OBJECTIVE:   /67 (BP Location: Left arm, Patient Position: Sitting, Cuff Size: Adult Regular)   Pulse 62   Temp 99.2  F (37.3  C)  "(Tympanic)   Ht 1.702 m (5' 7\")   Wt 70.3 kg (155 lb)   SpO2 99%   BMI 24.28 kg/m    Physical Exam  GENERAL: healthy, alert and no distress  NEURO: Normal strength and tone, mentation intact and speech normal  PSYCH: mentation appears normal, affect normal/bright    Diagnostic Test Results:  Labs reviewed in Epic  No results found for this or any previous visit (from the past 24 hour(s)).    ASSESSMENT/PLAN:   Maynor was seen today for physical and std.    Diagnoses and all orders for this visit:    Seasonal allergic rhinitis, unspecified trigger  -     cetirizine (ZYRTEC) 10 MG tablet; Take 1 tablet (10 mg) by mouth daily As needed allergies    Screening for STDs (sexually transmitted diseases)  -     NEISSERIA GONORRHOEA PCR; Future  -     CHLAMYDIA TRACHOMATIS PCR; Future  -     Wet preparation  -     HCG qualitative urine; Future    Other orders  -     REVIEW OF HEALTH MAINTENANCE PROTOCOL ORDERS        See AVS for rhintiis over the counter meds,    KENDAL Meeks  [facility]  "

## 2021-09-28 ENCOUNTER — OFFICE VISIT (OUTPATIENT)
Dept: FAMILY MEDICINE | Facility: CLINIC | Age: 21
End: 2021-09-28
Payer: COMMERCIAL

## 2021-09-28 VITALS
BODY MASS INDEX: 24.33 KG/M2 | WEIGHT: 155 LBS | OXYGEN SATURATION: 99 % | TEMPERATURE: 99.2 F | HEART RATE: 62 BPM | DIASTOLIC BLOOD PRESSURE: 67 MMHG | HEIGHT: 67 IN | SYSTOLIC BLOOD PRESSURE: 114 MMHG

## 2021-09-28 DIAGNOSIS — J30.2 SEASONAL ALLERGIC RHINITIS, UNSPECIFIED TRIGGER: Primary | ICD-10-CM

## 2021-09-28 DIAGNOSIS — Z11.3 SCREENING FOR STDS (SEXUALLY TRANSMITTED DISEASES): ICD-10-CM

## 2021-09-28 LAB
CLUE CELLS: ABNORMAL
HCG UR QL: NEGATIVE
TRICHOMONAS, WET PREP: ABNORMAL
WBC'S/HIGH POWER FIELD, WET PREP: ABNORMAL
YEAST, WET PREP: ABNORMAL

## 2021-09-28 PROCEDURE — 81025 URINE PREGNANCY TEST: CPT | Performed by: PHYSICIAN ASSISTANT

## 2021-09-28 PROCEDURE — 99385 PREV VISIT NEW AGE 18-39: CPT | Performed by: PHYSICIAN ASSISTANT

## 2021-09-28 PROCEDURE — 99213 OFFICE O/P EST LOW 20 MIN: CPT | Mod: 25 | Performed by: PHYSICIAN ASSISTANT

## 2021-09-28 PROCEDURE — 87491 CHLMYD TRACH DNA AMP PROBE: CPT | Performed by: PHYSICIAN ASSISTANT

## 2021-09-28 PROCEDURE — 87210 SMEAR WET MOUNT SALINE/INK: CPT | Performed by: PHYSICIAN ASSISTANT

## 2021-09-28 PROCEDURE — 87591 N.GONORRHOEAE DNA AMP PROB: CPT | Performed by: PHYSICIAN ASSISTANT

## 2021-09-28 RX ORDER — CETIRIZINE HYDROCHLORIDE 10 MG/1
10 TABLET ORAL DAILY
Qty: 20 TABLET | Refills: 1 | Status: ON HOLD | OUTPATIENT
Start: 2021-09-28 | End: 2023-06-28

## 2021-09-28 ASSESSMENT — ANXIETY QUESTIONNAIRES
GAD7 TOTAL SCORE: 2
7. FEELING AFRAID AS IF SOMETHING AWFUL MIGHT HAPPEN: NOT AT ALL
3. WORRYING TOO MUCH ABOUT DIFFERENT THINGS: NOT AT ALL
2. NOT BEING ABLE TO STOP OR CONTROL WORRYING: NOT AT ALL
5. BEING SO RESTLESS THAT IT IS HARD TO SIT STILL: NOT AT ALL
1. FEELING NERVOUS, ANXIOUS, OR ON EDGE: NOT AT ALL
6. BECOMING EASILY ANNOYED OR IRRITABLE: SEVERAL DAYS

## 2021-09-28 ASSESSMENT — ENCOUNTER SYMPTOMS
DIZZINESS: 0
CONSTIPATION: 0
FREQUENCY: 0
PARESTHESIAS: 0
SORE THROAT: 0
HEMATURIA: 0
EYE PAIN: 0
BREAST MASS: 0
ARTHRALGIAS: 0
JOINT SWELLING: 0
DYSURIA: 0
NAUSEA: 0
HEADACHES: 0
CHILLS: 0
MYALGIAS: 0
ABDOMINAL PAIN: 0
NERVOUS/ANXIOUS: 0
WEAKNESS: 0
PALPITATIONS: 0
HEARTBURN: 0
FEVER: 0
COUGH: 0
DIARRHEA: 0
SHORTNESS OF BREATH: 0
HEMATOCHEZIA: 0

## 2021-09-28 ASSESSMENT — MIFFLIN-ST. JEOR: SCORE: 1495.71

## 2021-09-28 ASSESSMENT — PATIENT HEALTH QUESTIONNAIRE - PHQ9
SUM OF ALL RESPONSES TO PHQ QUESTIONS 1-9: 0
5. POOR APPETITE OR OVEREATING: SEVERAL DAYS

## 2021-09-29 LAB — N GONORRHOEA DNA SPEC QL NAA+PROBE: NEGATIVE

## 2021-09-29 ASSESSMENT — ANXIETY QUESTIONNAIRES: GAD7 TOTAL SCORE: 2

## 2021-09-30 LAB — C TRACH DNA SPEC QL NAA+PROBE: NEGATIVE

## 2021-12-15 ENCOUNTER — HOSPITAL ENCOUNTER (EMERGENCY)
Facility: CLINIC | Age: 21
Discharge: HOME | End: 2021-12-15
Attending: EMERGENCY MEDICINE | Admitting: EMERGENCY MEDICINE
Payer: COMMERCIAL

## 2021-12-15 VITALS
SYSTOLIC BLOOD PRESSURE: 116 MMHG | DIASTOLIC BLOOD PRESSURE: 74 MMHG | TEMPERATURE: 99.4 F | BODY MASS INDEX: 23.96 KG/M2 | WEIGHT: 153 LBS | HEART RATE: 92 BPM | RESPIRATION RATE: 16 BRPM | OXYGEN SATURATION: 100 %

## 2021-12-15 DIAGNOSIS — T30.0 SUPERFICIAL BURN: ICD-10-CM

## 2021-12-15 PROCEDURE — 99282 EMERGENCY DEPT VISIT SF MDM: CPT | Performed by: EMERGENCY MEDICINE

## 2021-12-15 PROCEDURE — 99283 EMERGENCY DEPT VISIT LOW MDM: CPT | Performed by: EMERGENCY MEDICINE

## 2021-12-15 RX ORDER — BACITRACIN ZINC 500 [USP'U]/G
OINTMENT TOPICAL ONCE
Status: DISCONTINUED | OUTPATIENT
Start: 2021-12-15 | End: 2021-12-16 | Stop reason: HOSPADM

## 2021-12-15 ASSESSMENT — ENCOUNTER SYMPTOMS
CHEST TIGHTNESS: 0
FATIGUE: 0
CONSTIPATION: 0
SORE THROAT: 0
FEVER: 0
VOMITING: 0
MYALGIAS: 0
COUGH: 0
EYE PAIN: 0
WEAKNESS: 0
CONFUSION: 0
NAUSEA: 0
ROS SKIN COMMENTS: BURN
BACK PAIN: 0
FREQUENCY: 0
CHILLS: 0
DIFFICULTY URINATING: 0
ABDOMINAL DISTENTION: 0
DIARRHEA: 0
ARTHRALGIAS: 0
HEADACHES: 0
NECK PAIN: 0
COLOR CHANGE: 0
PALPITATIONS: 0
DYSURIA: 0
DIZZINESS: 0
SHORTNESS OF BREATH: 0
ABDOMINAL PAIN: 0

## 2021-12-16 NOTE — ED NOTES
Pt was given discharge paperwork and bacitracin to take home per MD. Pt was educated on burns and burn care. No questions or concerns.

## 2021-12-16 NOTE — ED PROVIDER NOTES
"      [facility] EMERGENCY DEPARTMENT ([facility])    12/15/21    ED20      History     Chief Complaint   Patient presents with     Burn     was at the salon today, the staff \" applied a very hot towel on my R leg\"       The history is provided by the patient and medical records.     Maynor Palomo is a 21 year old female with a past medical history of PTSD and depression who presents with a burn on the frontal aspect of her right calf.  She states that she was at the salon today when staff applied a hot towel to her leg.  The patient screamed but the staff member did not remove the towel.  She states that her whole leg was red and provides photographs.  She endorses tingling and burning currently in the leg.  She denies any chronic medical problems or medication use.  She states that she has intentions to report the salon.       Past Medical History  Past Medical History:   Diagnosis Date     Constipation      Depressive disorder      Migraines      Past Surgical History:   Procedure Laterality Date     BIOPSY LYMPH NODE CERVICAL       DILATION AND CURETTAGE SUCTION N/A 9/7/2021    Procedure: DILATION AND CURETTAGE, UTERUS, USING SUCTION;  Surgeon: Yessi Mcnamara MD;  Location: [facility] OR     LAPAROSCOPIC APPENDECTOMY N/A 1/4/2017    Procedure: LAPAROSCOPIC APPENDECTOMY;  Surgeon: Erick Casper MD;  Location: [facility] OR     TONSILLECTOMY, ADENOIDECTOMY WITH SHAVER, COMBINED       cetirizine (ZYRTEC) 10 MG tablet      Allergies   Allergen Reactions     Lactose GI Disturbance     Naproxen      Penicillins      Family History  Family History   Problem Relation Age of Onset     Depression Mother      Substance Abuse Mother         sober 6 yrs     Depression Father      Mental Illness Father         anger issues     Substance Abuse Father         current user     Depression Maternal Grandmother      Substance Abuse Maternal Grandmother      Mental Illness Paternal Grandmother      Substance Abuse Paternal " Grandmother      Mental Illness Paternal Grandfather      Substance Abuse Paternal Grandfather      Mental Illness Paternal Aunt      Substance Abuse Paternal Aunt      Mental Illness Paternal Uncle      Substance Abuse Paternal Uncle      Social History   Social History     Tobacco Use     Smoking status: Current Some Day Smoker     Packs/day: 1.00     Types: Cigarettes     Last attempt to quit: 5/15/2020     Years since quittin.5     Smokeless tobacco: Never Used   Substance Use Topics     Alcohol use: Not Currently     Alcohol/week: 0.0 standard drinks     Comment: last-2 weeks ago     Drug use: Not Currently     Types: Marijuana     Comment: THC-last 2 weeks ago      Past medical history, past surgical history, medications, allergies, family history, and social history were reviewed with the patient. No additional pertinent items.       Review of Systems   Constitutional: Negative for chills, fatigue and fever.   HENT: Negative for congestion and sore throat.    Eyes: Negative for pain and visual disturbance.   Respiratory: Negative for cough, chest tightness and shortness of breath.    Cardiovascular: Negative for chest pain and palpitations.   Gastrointestinal: Negative for abdominal distention, abdominal pain, constipation, diarrhea, nausea and vomiting.   Genitourinary: Negative for difficulty urinating, dysuria, frequency and urgency.   Musculoskeletal: Negative for arthralgias, back pain, myalgias and neck pain.   Skin: Negative for color change and rash.        burn   Neurological: Negative for dizziness, weakness and headaches.   Psychiatric/Behavioral: Negative for confusion.   All other systems reviewed and are negative.    A complete review of systems was performed with pertinent positives and negatives noted in the HPI, and all other systems negative.    Physical Exam   BP: 116/74  Pulse: 92  Temp: 99.4  F (37.4  C)  Resp: 16  Weight: 69.4 kg (153 lb)  SpO2: 100 %  Physical Exam  Vitals and  nursing note reviewed.   Constitutional:       General: She is not in acute distress.     Appearance: Normal appearance.   HENT:      Head: Normocephalic.      Nose: Nose normal.   Eyes:      Pupils: Pupils are equal, round, and reactive to light.   Cardiovascular:      Rate and Rhythm: Normal rate and regular rhythm.   Pulmonary:      Effort: Pulmonary effort is normal.   Abdominal:      General: There is no distension.   Musculoskeletal:         General: No deformity. Normal range of motion.      Cervical back: Normal range of motion.   Skin:     General: Skin is warm.          Neurological:      Mental Status: She is alert and oriented to person, place, and time.   Psychiatric:         Mood and Affect: Mood normal.         ED Course     10:13 PM  The patient was seen and examined by Brian Mendoza DO in Room ED20.     Procedures       The medical record was reviewed and interpreted.              No results found for any visits on 12/15/21.  Medications - No data to display     Assessments & Plan (with Medical Decision Making)   Patient presents to the ED for evaluation of superficial burn.  She states that a hot towel was placed on her leg earlier in the day.  On arrival, patient has reported pain over the right anterior lower leg.  See physical exam above.  This area is otherwise unremarkable.  No erythema, no blistering, no skin breakdown, or other signs of acute injury.    Petroleum ointment placed over the wound.  Patient educated on proper wound care.  acetaminophen/ibuprofen for pain at home.    I have reviewed the nursing notes. I have reviewed the findings, diagnosis, plan and need for follow up with the patient.    Discharge Medication List as of 12/15/2021 10:20 PM          Final diagnoses:   Superficial burn     ICoby, am serving as a trained medical scribe to document services personally performed by Brian Mendoza DO based on the provider's statements to me on December 15, 2021.  This  document has been checked and approved by the attending provider.    I, Brian Mendoza DO, was physically present and have reviewed and verified the accuracy of this note documented by Coby Zapata, medical scribe.      Brian Mendoza DO      --  Brian Mendoza DO  [facility] EMERGENCY DEPARTMENT  12/15/2021     Brian Mendoza DO  12/16/21 2103

## 2022-01-19 ENCOUNTER — HOSPITAL ENCOUNTER (EMERGENCY)
Facility: CLINIC | Age: 22
Discharge: HOME | End: 2022-01-19
Attending: EMERGENCY MEDICINE | Admitting: EMERGENCY MEDICINE
Payer: COMMERCIAL

## 2022-01-19 VITALS
RESPIRATION RATE: 18 BRPM | DIASTOLIC BLOOD PRESSURE: 84 MMHG | HEART RATE: 72 BPM | BODY MASS INDEX: 24.59 KG/M2 | WEIGHT: 157 LBS | TEMPERATURE: 98.6 F | SYSTOLIC BLOOD PRESSURE: 125 MMHG | OXYGEN SATURATION: 95 %

## 2022-01-19 DIAGNOSIS — H66.90 ACUTE OTITIS MEDIA, UNSPECIFIED OTITIS MEDIA TYPE: ICD-10-CM

## 2022-01-19 PROCEDURE — 99283 EMERGENCY DEPT VISIT LOW MDM: CPT | Performed by: EMERGENCY MEDICINE

## 2022-01-19 PROCEDURE — 250N000013 HC RX MED GY IP 250 OP 250 PS 637: Performed by: EMERGENCY MEDICINE

## 2022-01-19 PROCEDURE — 250N000009 HC RX 250: Performed by: EMERGENCY MEDICINE

## 2022-01-19 PROCEDURE — 99284 EMERGENCY DEPT VISIT MOD MDM: CPT | Performed by: EMERGENCY MEDICINE

## 2022-01-19 RX ORDER — ACETAMINOPHEN 500 MG
1000 TABLET ORAL ONCE
Status: COMPLETED | OUTPATIENT
Start: 2022-01-19 | End: 2022-01-19

## 2022-01-19 RX ORDER — IBUPROFEN 800 MG/1
800 TABLET, FILM COATED ORAL ONCE
Status: COMPLETED | OUTPATIENT
Start: 2022-01-19 | End: 2022-01-19

## 2022-01-19 RX ORDER — CEFDINIR 300 MG/1
300 CAPSULE ORAL 2 TIMES DAILY
Qty: 20 CAPSULE | Refills: 0 | Status: SHIPPED | OUTPATIENT
Start: 2022-01-19 | End: 2022-01-29

## 2022-01-19 RX ORDER — LIDOCAINE HYDROCHLORIDE 20 MG/ML
5 SOLUTION OROPHARYNGEAL ONCE
Status: COMPLETED | OUTPATIENT
Start: 2022-01-19 | End: 2022-01-19

## 2022-01-19 RX ADMIN — IBUPROFEN 800 MG: 800 TABLET ORAL at 19:24

## 2022-01-19 RX ADMIN — LIDOCAINE HYDROCHLORIDE 5 ML: 20 SOLUTION ORAL; TOPICAL at 19:23

## 2022-01-19 RX ADMIN — ACETAMINOPHEN 1000 MG: 500 TABLET ORAL at 19:27

## 2022-01-20 ENCOUNTER — HOSPITAL ENCOUNTER (EMERGENCY)
Facility: CLINIC | Age: 22
Discharge: HOME | End: 2022-01-20
Attending: EMERGENCY MEDICINE | Admitting: EMERGENCY MEDICINE
Payer: COMMERCIAL

## 2022-01-20 VITALS
RESPIRATION RATE: 18 BRPM | SYSTOLIC BLOOD PRESSURE: 110 MMHG | TEMPERATURE: 98.4 F | DIASTOLIC BLOOD PRESSURE: 77 MMHG | OXYGEN SATURATION: 100 % | HEART RATE: 85 BPM

## 2022-01-20 DIAGNOSIS — H66.92 LEFT OTITIS MEDIA: ICD-10-CM

## 2022-01-20 DIAGNOSIS — H92.02 LEFT EAR PAIN: ICD-10-CM

## 2022-01-20 DIAGNOSIS — H66.90 ACUTE OTITIS MEDIA, UNSPECIFIED OTITIS MEDIA TYPE: ICD-10-CM

## 2022-01-20 PROCEDURE — 99284 EMERGENCY DEPT VISIT MOD MDM: CPT | Performed by: EMERGENCY MEDICINE

## 2022-01-20 PROCEDURE — 250N000011 HC RX IP 250 OP 636: Performed by: EMERGENCY MEDICINE

## 2022-01-20 PROCEDURE — 99284 EMERGENCY DEPT VISIT MOD MDM: CPT

## 2022-01-20 PROCEDURE — 96372 THER/PROPH/DIAG INJ SC/IM: CPT | Performed by: EMERGENCY MEDICINE

## 2022-01-20 RX ORDER — IBUPROFEN 600 MG/1
600 TABLET, FILM COATED ORAL ONCE
Status: DISCONTINUED | OUTPATIENT
Start: 2022-01-20 | End: 2022-01-20 | Stop reason: CLARIF

## 2022-01-20 RX ORDER — KETOROLAC TROMETHAMINE 30 MG/ML
60 INJECTION, SOLUTION INTRAMUSCULAR; INTRAVENOUS ONCE
Status: COMPLETED | OUTPATIENT
Start: 2022-01-20 | End: 2022-01-20

## 2022-01-20 RX ORDER — KETOROLAC TROMETHAMINE 30 MG/ML
60 INJECTION, SOLUTION INTRAMUSCULAR; INTRAVENOUS ONCE
Status: DISCONTINUED | OUTPATIENT
Start: 2022-01-20 | End: 2022-01-20

## 2022-01-20 RX ADMIN — KETOROLAC TROMETHAMINE 60 MG: 30 INJECTION, SOLUTION INTRAMUSCULAR at 15:35

## 2022-01-20 ASSESSMENT — ENCOUNTER SYMPTOMS
RHINORRHEA: 0
SORE THROAT: 0
WEAKNESS: 0
COUGH: 0
TROUBLE SWALLOWING: 0
FEVER: 0
VOMITING: 0
SINUS PRESSURE: 0
PHOTOPHOBIA: 0
EYE REDNESS: 0

## 2022-01-20 NOTE — ED TRIAGE NOTES
Pt states she was seen here yesterday for left ear pain is not getting any relief from her prescribed meds.

## 2022-01-20 NOTE — ED TRIAGE NOTES
Patient presents today with reports of 10/10 pressure to left ear and feels as though something is stuck inside. Patient is unable to hear out of left ear and reports tingling to left side of face.

## 2022-01-20 NOTE — DISCHARGE INSTRUCTIONS
Please make an appointment to follow up with Your Primary Care Provider as needed.    Cefdinir as directed.  This is an antibiotic to treat the infection.    Tylenol for pain.    Use Afrin nasal spray, 2 sprays in each nostril twice per day for the next 2 days to help try to equalize the pressure in your ear.    Return to the emergency department for any problems.

## 2022-01-20 NOTE — ED PROVIDER NOTES
ED Provider Note  [facility]      History     Chief Complaint   Patient presents with     Ear Fullness     HPI  Maynor Palomo is a 21 year old female who presents to the emergency department for evaluation of left ear pain.  Patient states that she developed left ear pain yesterday morning when she woke up.  She was evaluated in the emergency department yesterday and was diagnosed with a left otitis media.  She was placed on cefdinir.  The patient did not yet fill the prescription nor begin taking it.  She states that she took acetaminophen today without relief of her ear pain.  She also took 200 mg of ibuprofen.  She complains of pain localized to the left ear and also radiating into her face.  She denies any tooth pain or difficulty chewing.  No neck or throat pain.  She denies any swimming or submersion of her head underwater.  She denies any chiropractic care or cracking her neck.    Past Medical History  Past Medical History:   Diagnosis Date     Constipation      Depressive disorder      Migraines      Past Surgical History:   Procedure Laterality Date     BIOPSY LYMPH NODE CERVICAL       DILATION AND CURETTAGE SUCTION N/A 9/7/2021    Procedure: DILATION AND CURETTAGE, UTERUS, USING SUCTION;  Surgeon: Yessi Mcnamara MD;  Location[facility]     LAPAROSCOPIC APPENDECTOMY N/A 1/4/2017    Procedure: LAPAROSCOPIC APPENDECTOMY;  Surgeon: Erick Casper MD;  Location: [facility]     TONSILLECTOMY, ADENOIDECTOMY WITH SHAVER, COMBINED       cefdinir (OMNICEF) 300 MG capsule  cetirizine (ZYRTEC) 10 MG tablet      Allergies   Allergen Reactions     Lactose GI Disturbance     Naproxen      Penicillins      Family History  Family History   Problem Relation Age of Onset     Depression Mother      Substance Abuse Mother         sober 6 yrs     Depression Father      Mental Illness Father         anger issues     Substance Abuse Father         current user     Depression Maternal  Grandmother      Substance Abuse Maternal Grandmother      Mental Illness Paternal Grandmother      Substance Abuse Paternal Grandmother      Mental Illness Paternal Grandfather      Substance Abuse Paternal Grandfather      Mental Illness Paternal Aunt      Substance Abuse Paternal Aunt      Mental Illness Paternal Uncle      Substance Abuse Paternal Uncle      Social History   Social History     Tobacco Use     Smoking status: Current Some Day Smoker     Packs/day: 1.00     Types: Cigarettes     Last attempt to quit: 5/15/2020     Years since quittin.6     Smokeless tobacco: Never Used   Substance Use Topics     Alcohol use: Not Currently     Alcohol/week: 0.0 standard drinks     Comment: last-2 weeks ago     Drug use: Not Currently     Types: Marijuana     Comment: THC-last 2 weeks ago      Past medical history, past surgical history, medications, allergies, family history, and social history were reviewed with the patient. No additional pertinent items.       Review of Systems   Constitutional: Negative for fever.   HENT: Positive for ear pain. Negative for dental problem, rhinorrhea, sinus pressure, sore throat and trouble swallowing.    Eyes: Negative for photophobia and redness.   Respiratory: Negative for cough.    Cardiovascular: Negative for chest pain.   Gastrointestinal: Negative for vomiting.   Skin: Negative for rash.   Neurological: Negative for weakness.   All other systems reviewed and are negative.    A complete review of systems was performed with pertinent positives and negatives noted in the HPI, and all other systems negative.    Physical Exam   BP: 110/77  Pulse: 85  Temp: 98.4  F (36.9  C)  Resp: 18  SpO2: 100 %  Physical Exam  Vitals and nursing note reviewed.   Constitutional:       Appearance: Normal appearance.   HENT:      Head: Normocephalic and atraumatic.      Comments: No pain with movement of tragus or pinna left ear.     Left Ear: No drainage or swelling. No mastoid tenderness.  Tympanic membrane is injected and erythematous.      Nose: Nose normal.      Mouth/Throat:      Mouth: Mucous membranes are moist.      Pharynx: No oropharyngeal exudate or posterior oropharyngeal erythema.   Eyes:      Extraocular Movements: Extraocular movements intact.      Pupils: Pupils are equal, round, and reactive to light.   Neck:      Vascular: Normal carotid pulses. No carotid bruit.   Musculoskeletal:      Cervical back: Normal range of motion.   Lymphadenopathy:      Cervical: No cervical adenopathy.   Neurological:      Mental Status: She is alert.         ED Course      Procedures              No results found for any visits on 01/20/22.  Medications   ketorolac (TORADOL) injection 60 mg (has no administration in time range)        Assessments & Plan (with Medical Decision Making)   21 year old female to the emergency department with ongoing left ear pain.  She was seen here yesterday and diagnosed with otitis media.  She was prescribed cefdinir that she has not yet initiated.  She took acetaminophen and a 200 mg of ibuprofen for her discomfort.  On exam, no other etiology is identified as a cause for her pain.  Her left ear TM is injected.  Recommend increasing dose of ibuprofen.  Patient should also fill the prescription for cefdinir and take the complete course as directed.    I have reviewed the nursing notes. I have reviewed the findings, diagnosis, plan and need for follow up with the patient.    New Prescriptions    No medications on file       Final diagnoses:   Acute otitis media, unspecified otitis media type   Left ear pain     Chart documentation was completed with Dragon voice-recognition software. Even though reviewed, this chart may still contain some grammatical, spelling, and word errors.     --  Miguel Encinas Md  [facility] EMERGENCY DEPARTMENT  1/20/2022     Miguel Encinas MD  01/21/22 0339

## 2022-01-20 NOTE — ED PROVIDER NOTES
[facility] ED Provider Note  [facility]      History     Chief Complaint   Patient presents with     Foreign Body in Ear     Patient reports feeling as though there is a foreign body inside ear. Patient woke up today feeling this way. Patient reports 10/10 pressure to left ear.      The history is provided by the patient and medical records.     Maynor Palomo is a 21 year old female who presents to the ED for evaluation of left ear pain. Patient reports waking up with severe left ear pressure today. She has not taken anything for pain today. She denies discharge from the ear. She has not recently had a cold.      Past Medical History  Past Medical History:   Diagnosis Date     Constipation      Depressive disorder      Migraines      Past Surgical History:   Procedure Laterality Date     BIOPSY LYMPH NODE CERVICAL       DILATION AND CURETTAGE SUCTION N/A 9/7/2021    Procedure: DILATION AND CURETTAGE, UTERUS, USING SUCTION;  Surgeon: Yessi Mcnamara MD;  [facility]     LAPAROSCOPIC APPENDECTOMY N/A 1/4/2017    Procedure: LAPAROSCOPIC APPENDECTOMY;  Surgeon: Erick Casper MD;  Location: [facility] OR     TONSILLECTOMY, ADENOIDECTOMY WITH SHAVER, COMBINED       cefdinir (OMNICEF) 300 MG capsule  cetirizine (ZYRTEC) 10 MG tablet      Allergies   Allergen Reactions     Lactose GI Disturbance     Naproxen      Penicillins      Family History  Family History   Problem Relation Age of Onset     Depression Mother      Substance Abuse Mother         sober 6 yrs     Depression Father      Mental Illness Father         anger issues     Substance Abuse Father         current user     Depression Maternal Grandmother      Substance Abuse Maternal Grandmother      Mental Illness Paternal Grandmother      Substance Abuse Paternal Grandmother      Mental Illness Paternal Grandfather      Substance Abuse Paternal Grandfather      Mental Illness Paternal Aunt      Substance Abuse Paternal Aunt       Mental Illness Paternal Uncle      Substance Abuse Paternal Uncle      Social History   Social History     Tobacco Use     Smoking status: Current Some Day Smoker     Packs/day: 1.00     Types: Cigarettes     Last attempt to quit: 5/15/2020     Years since quittin.6     Smokeless tobacco: Never Used   Substance Use Topics     Alcohol use: Not Currently     Alcohol/week: 0.0 standard drinks     Comment: last-2 weeks ago     Drug use: Not Currently     Types: Marijuana     Comment: THC-last 2 weeks ago      Past medical history, past surgical history, medications, allergies, family history, and social history were reviewed with the patient. No additional pertinent items.       Review of Systems   HENT: Positive for ear pain (left). Negative for ear discharge.    All other systems reviewed and are negative.    A complete review of systems was performed with pertinent positives and negatives noted in the HPI, and all other systems negative.    Physical Exam   BP: 125/84  Pulse: 72  Temp: 98.6  F (37  C)  Resp: 18  Weight: 71.2 kg (157 lb)  SpO2: 95 %  Physical Exam  Vitals and nursing note reviewed.   Constitutional:       General: She is not in acute distress.     Appearance: She is well-developed. She is not ill-appearing or toxic-appearing.   HENT:      Head: Normocephalic and atraumatic.      Left Ear: Ear canal and external ear normal. No drainage. Tympanic membrane is injected, erythematous and retracted.   Eyes:      General: No scleral icterus.     Pupils: Pupils are equal, round, and reactive to light.   Cardiovascular:      Rate and Rhythm: Normal rate.   Pulmonary:      Effort: Pulmonary effort is normal. No respiratory distress.   Musculoskeletal:      Cervical back: Normal range of motion and neck supple.   Skin:     General: Skin is warm and dry.      Coloration: Skin is not pale.      Findings: No rash.   Neurological:      Mental Status: She is alert and oriented to person, place, and time.       Sensory: No sensory deficit.   Psychiatric:         Behavior: Behavior normal.         ED Course      Procedures                     No results found for any visits on 01/19/22.  Medications   lidocaine (viscous) (XYLOCAINE) 2 % solution 5 mL (5 mLs Topical Given 1/19/22 1923)   ibuprofen (ADVIL/MOTRIN) tablet 800 mg (800 mg Oral Given 1/19/22 1924)   acetaminophen (TYLENOL) tablet 1,000 mg (1,000 mg Oral Given 1/19/22 1927)        Assessments & Plan (with Medical Decision Making)     This patient presented to the emergency department with severe left ear pain.  Exam demonstrates findings consistent with otitis media.  2% lidocaine, viscous, was instilled into the ear with little effect.  Patient was given ibuprofen and Tylenol and was discharged with a prescription for cefdinir which was sent to her pharmacy.  She was provided with instructions for care and follow-up and was discharged in good condition.    I have reviewed the nursing notes. I have reviewed the findings, diagnosis, plan and need for follow up with the patient.    New Prescriptions    CEFDINIR (OMNICEF) 300 MG CAPSULE    Take 1 capsule (300 mg) by mouth 2 times daily for 10 days       Final diagnoses:   Acute otitis media, unspecified otitis media type   I, Martha Vernon, am serving as a trained medical scribe to document services personally performed by José Resendiz MD, based on the provider's statements to me.     I, José Resendiz MD, was physically present and have reviewed and verified the accuracy of this note documented by Martha Vernon.      --  José Resendiz MD  [facility] EMERGENCY DEPARTMENT  1/19/2022     José Resendiz MD  01/19/22 1927

## 2022-01-20 NOTE — DISCHARGE INSTRUCTIONS
Fill the antibiotic that you were prescribed yesterday and take as directed.  Continue acetaminophen.  Take ibuprofen 600 mg every 6 hours with food as needed for pain.    Follow-up with your primary care clinic next week if not improving.

## 2022-01-20 NOTE — ED NOTES
Patient stated that she was afraid of needles and did not wish to continue with the injection. I discussed this with Dr. Encinas who ordered ibuprofen. When I brought this to the patient she stated that she did not think this would cover her pain and wished to go ahead with the ketorolac IM instead. I discussed this with Dr. Encinas who reordered the ketorolac. Patient confirms that she has had ketorolac/Toradol before with no issues.

## 2022-04-05 ENCOUNTER — HOSPITAL ENCOUNTER (EMERGENCY)
Facility: CLINIC | Age: 22
Discharge: HOME | End: 2022-04-06
Attending: EMERGENCY MEDICINE | Admitting: EMERGENCY MEDICINE
Payer: COMMERCIAL

## 2022-04-05 DIAGNOSIS — J10.1 INFLUENZA A: ICD-10-CM

## 2022-04-05 PROCEDURE — C9803 HOPD COVID-19 SPEC COLLECT: HCPCS | Performed by: EMERGENCY MEDICINE

## 2022-04-05 PROCEDURE — 99284 EMERGENCY DEPT VISIT MOD MDM: CPT | Performed by: EMERGENCY MEDICINE

## 2022-04-05 PROCEDURE — 99283 EMERGENCY DEPT VISIT LOW MDM: CPT | Performed by: EMERGENCY MEDICINE

## 2022-04-05 NOTE — LETTER
April 6, 2022      To Whom It May Concern:      Maynor Palomo was seen in our Emergency Department today, 04/06/22.  She is excused from work until 4/12/2022  Sincerely,        Maksim Ryan, DO

## 2022-04-06 VITALS
RESPIRATION RATE: 16 BRPM | WEIGHT: 150 LBS | HEIGHT: 67 IN | BODY MASS INDEX: 23.54 KG/M2 | TEMPERATURE: 99.4 F | OXYGEN SATURATION: 99 % | HEART RATE: 85 BPM | SYSTOLIC BLOOD PRESSURE: 120 MMHG | DIASTOLIC BLOOD PRESSURE: 72 MMHG

## 2022-04-06 LAB
ALBUMIN UR-MCNC: 70 MG/DL
APPEARANCE UR: ABNORMAL
BILIRUB UR QL STRIP: NEGATIVE
COLOR UR AUTO: YELLOW
DEPRECATED S PYO AG THROAT QL EIA: NEGATIVE
FLUAV RNA SPEC QL NAA+PROBE: POSITIVE
FLUBV RNA RESP QL NAA+PROBE: NEGATIVE
GLUCOSE UR STRIP-MCNC: NEGATIVE MG/DL
GROUP A STREP BY PCR: NOT DETECTED
HCG UR QL: NEGATIVE
HGB UR QL STRIP: ABNORMAL
KETONES UR STRIP-MCNC: NEGATIVE MG/DL
LEUKOCYTE ESTERASE UR QL STRIP: ABNORMAL
MUCOUS THREADS #/AREA URNS LPF: PRESENT /LPF
NITRATE UR QL: NEGATIVE
PH UR STRIP: 7.5 [PH] (ref 5–7)
RBC URINE: >182 /HPF
SARS-COV-2 RNA RESP QL NAA+PROBE: NEGATIVE
SP GR UR STRIP: 1.02 (ref 1–1.03)
SQUAMOUS EPITHELIAL: 2 /HPF
UROBILINOGEN UR STRIP-MCNC: NORMAL MG/DL
WBC URINE: >182 /HPF

## 2022-04-06 PROCEDURE — 250N000013 HC RX MED GY IP 250 OP 250 PS 637: Performed by: EMERGENCY MEDICINE

## 2022-04-06 PROCEDURE — 87651 STREP A DNA AMP PROBE: CPT | Performed by: EMERGENCY MEDICINE

## 2022-04-06 PROCEDURE — 81001 URINALYSIS AUTO W/SCOPE: CPT | Performed by: EMERGENCY MEDICINE

## 2022-04-06 PROCEDURE — 87636 SARSCOV2 & INF A&B AMP PRB: CPT | Performed by: EMERGENCY MEDICINE

## 2022-04-06 PROCEDURE — 81025 URINE PREGNANCY TEST: CPT | Performed by: EMERGENCY MEDICINE

## 2022-04-06 PROCEDURE — 87088 URINE BACTERIA CULTURE: CPT | Performed by: EMERGENCY MEDICINE

## 2022-04-06 RX ORDER — GUAIFENESIN AND DEXTROMETHORPHAN HYDROBROMIDE 600; 30 MG/1; MG/1
1 TABLET, EXTENDED RELEASE ORAL 2 TIMES DAILY PRN
Status: DISCONTINUED | OUTPATIENT
Start: 2022-04-06 | End: 2022-04-06 | Stop reason: HOSPADM

## 2022-04-06 RX ORDER — ACETAMINOPHEN 325 MG/1
975 TABLET ORAL ONCE
Status: COMPLETED | OUTPATIENT
Start: 2022-04-06 | End: 2022-04-06

## 2022-04-06 RX ORDER — IBUPROFEN 600 MG/1
600 TABLET, FILM COATED ORAL ONCE
Status: COMPLETED | OUTPATIENT
Start: 2022-04-06 | End: 2022-04-06

## 2022-04-06 RX ORDER — OXYCODONE HYDROCHLORIDE 5 MG/1
5 TABLET ORAL ONCE
Status: COMPLETED | OUTPATIENT
Start: 2022-04-06 | End: 2022-04-06

## 2022-04-06 RX ADMIN — IBUPROFEN 600 MG: 600 TABLET ORAL at 01:00

## 2022-04-06 RX ADMIN — ACETAMINOPHEN 975 MG: 325 TABLET ORAL at 01:00

## 2022-04-06 RX ADMIN — GUAIFENESIN AND DEXTROMETHORPHAN HYDROBROMIDE 1 TABLET: 600; 30 TABLET, EXTENDED RELEASE ORAL at 01:00

## 2022-04-06 RX ADMIN — OXYCODONE HYDROCHLORIDE 5 MG: 5 TABLET ORAL at 01:52

## 2022-04-06 ASSESSMENT — ENCOUNTER SYMPTOMS
COUGH: 1
MYALGIAS: 1
SORE THROAT: 1
FEVER: 1
HEADACHES: 1

## 2022-04-06 NOTE — ED PROVIDER NOTES
"    [place] EMERGENCY DEPARTMENT ([facility])  4/05/22 ED05  History     Chief Complaint   Patient presents with     Headache     started 4 days ago, no sick exposures     The history is provided by the patient and medical records.     Maynor Palomo is a 21 year old female with a past medical history of migraines who presents to the emergency department for evaluation of headache. Patient reports she has been sick for 3-4 days now, experiencing symptoms of headache, sore throat, body aches, and cough. Patient states her headache is pulsing, like it is \"going to explode\". Patient has not tried anything for her symptoms. No prior COVID tests before today in the ED. Patient is not vaccinated for COVID. She reports she had COVID last year.     Past Medical History  Past Medical History:   Diagnosis Date     Constipation      Depressive disorder      Migraines      Past Surgical History:   Procedure Laterality Date     BIOPSY LYMPH NODE CERVICAL       DILATION AND CURETTAGE SUCTION N/A 9/7/2021    Procedure: DILATION AND CURETTAGE, UTERUS, USING SUCTION;  Surgeon: Yessi Mcnamara MD;  Location: [facility] OR     LAPAROSCOPIC APPENDECTOMY N/A 1/4/2017    Procedure: LAPAROSCOPIC APPENDECTOMY;  Surgeon: Erick Casper MD;  Location: [facility] OR     TONSILLECTOMY, ADENOIDECTOMY WITH SHAVER, COMBINED       cetirizine (ZYRTEC) 10 MG tablet      Allergies   Allergen Reactions     Lactose GI Disturbance     Naproxen      Penicillins      Family History  Family History   Problem Relation Age of Onset     Depression Mother      Substance Abuse Mother         sober 6 yrs     Depression Father      Mental Illness Father         anger issues     Substance Abuse Father         current user     Depression Maternal Grandmother      Substance Abuse Maternal Grandmother      Mental Illness Paternal Grandmother      Substance Abuse Paternal Grandmother      Mental Illness Paternal Grandfather      Substance Abuse Paternal " "Grandfather      Mental Illness Paternal Aunt      Substance Abuse Paternal Aunt      Mental Illness Paternal Uncle      Substance Abuse Paternal Uncle      Social History   Social History     Tobacco Use     Smoking status: Current Some Day Smoker     Packs/day: 1.00     Types: Cigarettes     Last attempt to quit: 5/15/2020     Years since quittin.8     Smokeless tobacco: Never Used   Substance Use Topics     Alcohol use: Not Currently     Alcohol/week: 0.0 standard drinks     Drug use: Not Currently     Types: Marijuana      Past medical history, past surgical history, medications, allergies, family history, and social history were reviewed with the patient. No additional pertinent items.       Review of Systems   Constitutional: Positive for fever (100.2).   HENT: Positive for sneezing and sore throat.    Respiratory: Positive for cough.    Musculoskeletal: Positive for myalgias (generalized).   Neurological: Positive for headaches.   All other systems reviewed and are negative.    A complete review of systems was performed with pertinent positives and negatives noted in the HPI, and all other systems negative.    Physical Exam   BP: 121/79  Pulse: 99  Temp: 100.2  F (37.9  C)  Resp: 18  Height: 170.2 cm (5' 7\")  Weight: 68 kg (150 lb)  SpO2: 100 %  Physical Exam  Constitutional:       General: She is not in acute distress.     Appearance: She is not diaphoretic.   HENT:      Head: Normocephalic and atraumatic.      Right Ear: External ear normal.      Left Ear: External ear normal.      Nose: Nose normal.   Eyes:      Extraocular Movements: Extraocular movements intact.      Conjunctiva/sclera: Conjunctivae normal.   Cardiovascular:      Rate and Rhythm: Normal rate and regular rhythm.      Heart sounds: Normal heart sounds.   Pulmonary:      Effort: Pulmonary effort is normal. No respiratory distress.      Breath sounds: Normal breath sounds.   Abdominal:      General: There is no distension.      " Palpations: Abdomen is soft.      Tenderness: There is no abdominal tenderness.   Musculoskeletal:         General: No swelling or deformity.      Cervical back: Normal range of motion and neck supple.   Skin:     General: Skin is warm and dry.   Neurological:      Mental Status: Mental status is at baseline.      Comments: Alert, oriented   Psychiatric:         Mood and Affect: Mood normal.         Behavior: Behavior normal.         ED Course     12:10 AM  The patient was seen and examined by Maksim Ryan DO in Room 05.    Procedures       Results for orders placed or performed during the hospital encounter of 04/05/22   Symptomatic; Unknown Influenza A/B & SARS-CoV2 (COVID-19) Virus PCR Multiplex Nasopharyngeal     Status: Abnormal    Specimen: Nasopharyngeal; Swab   Result Value Ref Range    Influenza A PCR Positive (A) Negative    Influenza B PCR Negative Negative    SARS CoV2 PCR Negative Negative    Narrative    Testing was performed using the [facility] SARS-CoV-2 & Influenza A/B Assay on the [facility]illon [employer] System. This test should be ordered for the detection of SARS-CoV-2 and influenza viruses in individuals who meet clinical and/or epidemiological criteria. Test performance is unknown in asymptomatic patients. This test is for in vitro diagnostic use under the FDA EUA for laboratories certified under CLIA to perform moderate and/or high complexity testing. This test has not been FDA cleared or approved. A negative result does not rule out the presence of PCR inhibitors in the specimen or target RNA in concentration below the limit of detection for the assay. If only one viral target is positive but coinfection with multiple targets is suspected, the sample should be re-tested with another FDA cleared, approved or authorized test, if coinfection would change clinical management. [facility] are certified under the Clinical Laboratory Improvement Amendments of 1988 (CLIA-88) as  qualified to  perform moderate and/or high complexity laboratory testing.   UA with Microscopic reflex to Culture     Status: Abnormal    Specimen: Urine, Midstream   Result Value Ref Range    Color Urine Yellow Colorless, Straw, Light Yellow, Yellow    Appearance Urine Slightly Cloudy (A) Clear    Glucose Urine Negative Negative mg/dL    Bilirubin Urine Negative Negative    Ketones Urine Negative Negative mg/dL    Specific Gravity Urine 1.023 1.003 - 1.035    Blood Urine Large (A) Negative    pH Urine 7.5 (H) 5.0 - 7.0    Protein Albumin Urine 70  (A) Negative mg/dL    Urobilinogen Urine Normal Normal, 2.0 mg/dL    Nitrite Urine Negative Negative    Leukocyte Esterase Urine Large (A) Negative    Mucus Urine Present (A) None Seen /LPF    RBC Urine >182 (H) <=2 /HPF    WBC Urine >182 (H) <=5 /HPF    Squamous Epithelials Urine 2 (H) <=1 /HPF    Narrative    Urine Culture ordered based on laboratory criteria   HCG qualitative urine (UPT)     Status: Normal   Result Value Ref Range    hCG Urine Qualitative Negative Negative   Streptococcus A Rapid Screen w/Reflex to PCR     Status: Normal    Specimen: Throat; Swab   Result Value Ref Range    Group A Strep antigen Negative Negative              Results for orders placed or performed during the hospital encounter of 04/05/22   Symptomatic; Unknown Influenza A/B & SARS-CoV2 (COVID-19) Virus PCR Multiplex Nasopharyngeal     Status: Abnormal    Specimen: Nasopharyngeal; Swab   Result Value Ref Range    Influenza A PCR Positive (A) Negative    Influenza B PCR Negative Negative    SARS CoV2 PCR Negative Negative    Narrative    Testing was performed using the [facility] SARS-CoV-2 & Influenza A/B Assay on the [facility] [employer] System. This test should be ordered for the detection of SARS-CoV-2 and influenza viruses in individuals who meet clinical and/or epidemiological criteria. Test performance is unknown in asymptomatic patients. This test is for in vitro diagnostic use under the FDA EUA for  laboratories certified under CLIA to perform moderate and/or high complexity testing. This test has not been FDA cleared or approved. A negative result does not rule out the presence of PCR inhibitors in the specimen or target RNA in concentration below the limit of detection for the assay. If only one viral target is positive but coinfection with multiple targets is suspected, the sample should be re-tested with another FDA cleared, approved or authorized test, if coinfection would change clinical management. [facility] are certified under the Clinical Laboratory Improvement Amendments of 1988 (CLIA-88) as  qualified to perform moderate and/or high complexity laboratory testing.   UA with Microscopic reflex to Culture     Status: Abnormal    Specimen: Urine, Midstream   Result Value Ref Range    Color Urine Yellow Colorless, Straw, Light Yellow, Yellow    Appearance Urine Slightly Cloudy (A) Clear    Glucose Urine Negative Negative mg/dL    Bilirubin Urine Negative Negative    Ketones Urine Negative Negative mg/dL    Specific Gravity Urine 1.023 1.003 - 1.035    Blood Urine Large (A) Negative    pH Urine 7.5 (H) 5.0 - 7.0    Protein Albumin Urine 70  (A) Negative mg/dL    Urobilinogen Urine Normal Normal, 2.0 mg/dL    Nitrite Urine Negative Negative    Leukocyte Esterase Urine Large (A) Negative    Mucus Urine Present (A) None Seen /LPF    RBC Urine >182 (H) <=2 /HPF    WBC Urine >182 (H) <=5 /HPF    Squamous Epithelials Urine 2 (H) <=1 /HPF    Narrative    Urine Culture ordered based on laboratory criteria   HCG qualitative urine (UPT)     Status: Normal   Result Value Ref Range    hCG Urine Qualitative Negative Negative   Streptococcus A Rapid Screen w/Reflex to PCR     Status: Normal    Specimen: Throat; Swab   Result Value Ref Range    Group A Strep antigen Negative Negative     Medications   dextromethorphan-guaiFENesin (MUCINEX DM)  MG per 12 hr tablet 1 tablet (1 tablet Oral Given  4/6/22 0100)   acetaminophen (TYLENOL) tablet 975 mg (975 mg Oral Given 4/6/22 0100)   ibuprofen (ADVIL/MOTRIN) tablet 600 mg (600 mg Oral Given 4/6/22 0100)   oxyCODONE (ROXICODONE) tablet 5 mg (5 mg Oral Given 4/6/22 0152)        Assessments & Plan (with Medical Decision Making)   21-year-old female presents to us with a chief complaint of headache, myalgias, cough, URI symptoms.  Previous records are reviewed.  Vital signs are significant for temperature of 100.2.  Labs are reviewed and show a positive influenza test.  Urine shows a lot of red cells and white cells.  Patient does note she is just getting off of her menstrual period.  She is not having dysuria.  Recommend Tylenol and ibuprofen and we will give her a work note.    I have reviewed the nursing notes. I have reviewed the findings, diagnosis, plan and need for follow up with the patient.    New Prescriptions    No medications on file       Final diagnoses:   Influenza A     I, Stefanie Lynch, am serving as a trained medical scribe to document services personally performed by Maksim Ryan DO based on the provider's statements to me on April 6, 2022.  This document has been checked and approved by the attending provider.    I, Maksim Ryan DO, was physically present and have reviewed and verified the accuracy of this note documented by Stefanie Lynch medical scribe.      Maksim Ryan DO  --  Maksim Ryan DO  [facility] EMERGENCY DEPARTMENT  4/5/2022     Maksim Ryan DO  04/06/22 0206

## 2022-04-06 NOTE — ED NOTES
Bed: ED05  Expected date:   Expected time:   Means of arrival:   Comments:  Becky Little 7[age] y/o F H/A

## 2022-04-06 NOTE — RESULT ENCOUNTER NOTE
Group A Streptococcus PCR is NEGATIVE  No treatment or change in treatment [facility] ED lab result Strep Group A protocol.

## 2022-04-07 ENCOUNTER — TELEPHONE (OUTPATIENT)
Dept: EMERGENCY MEDICINE | Facility: CLINIC | Age: 22
End: 2022-04-07
Payer: COMMERCIAL

## 2022-04-07 LAB — BACTERIA UR CULT: ABNORMAL

## 2022-04-07 NOTE — TELEPHONE ENCOUNTER
"[facility] () Emergency Department Lab result notification [Adult-Female]    [place] ED lab result protocol used  Urine Culture    Reason for call  Notify of lab results, assess symptoms,  review ED providers recommendations/discharge instructions (if necessary) and advise per ED lab result f/u protocol    Lab Result (including Rx patient on, if applicable)  Preliminary urine culture report on 4/7/22 shows the presence of bacteria(s):  50,000 - 100,000 colonies/mL Staphylococcus aureus  Emergency Dept/Urgent Care discharge antibiotic: None  Recommendations per [facility] ED Lab result Urine culture protocol.    Information table from Emergency Dept Provider visit on 4/5/22  Symptoms reported at ED visit (Chief complaint, HPI) Chief Complaint   Patient presents with     Headache       started 4 days ago, no sick exposures      The history is provided by the patient and medical records.      Maynor Palomo is a 21 year old female with a past medical history of migraines who presents to the emergency department for evaluation of headache. Patient reports she has been sick for 3-4 days now, experiencing symptoms of headache, sore throat, body aches, and cough. Patient states her headache is pulsing, like it is \"going to explode\". Patient has not tried anything for her symptoms. No prior COVID tests before today in the ED. Patient is not vaccinated for COVID. She reports she had COVID last year.      Significant Medical hx, if applicable (i.e. CKD, diabetes) Reviewed   Allergies Allergies   Allergen Reactions     Lactose GI Disturbance     Naproxen      Penicillins       Weight, if applicable Wt Readings from Last 2 Encounters:   04/05/22 68 kg (150 lb)   01/19/22 71.2 kg (157 lb)      Coumadin/Warfarin [Yes /No] NO   Creatinine Level (mg/dl) Creatinine   Date Value Ref Range Status   08/28/2021 0.81 0.52 - 1.04 mg/dL Final   01/22/2020 0.72 0.52 - 1.04 mg/dL Final    "   Creatinine clearance (ml/min), if applicable Creatinine clearance cannot be calculated (Patient's most recent lab result is older than the maximum 30 days allowed.)  Calculated from above values - creatinine clearance: 116.108   Pregnant (Yes/No/NA) Negative per HCG 4/6/22   Breastfeeding (Yes/No/NA) Unknown   ED providers Impression and Plan (applicable information) 21-year-old female presents to us with a chief complaint of headache, myalgias, cough, URI symptoms.  Previous records are reviewed.  Vital signs are significant for temperature of 100.2.  Labs are reviewed and show a positive influenza test.  Urine shows a lot of red cells and white cells.  Patient does note she is just getting off of her menstrual period.  She is not having dysuria.  Recommend Tylenol and ibuprofen and we will give her a work note.     I have reviewed the nursing notes. I have reviewed the findings, diagnosis, plan and need for follow up with the patient.   ED diagnosis  Influenza A   ED provider  Maksim Ryan, DO      RN Assessment (Patient s current Symptoms), include time called.  Breastfeeding: unknown  All patient numbers are disconnected/busy - call attempt to mother - Carol - not present at visit, no consent on file - attempted to speak with mom about this situation, but she is continually and easily agitated, makes assumptions, and interrupts when writer attempts to speak or explain this patients situation and hangs up but does says she will give her the message,  reviewed appropriate consent.  Mom is not with patient - writer would not feel comfortable offering treatment without assessment.  Mother is aware that child is out of emergency department at this time - she reports she has spoken with patient since discharge      Malena Vieyra RN  [facility]  Emergency Dept Lab Result RN  Ph# [phone]     Copy of Lab result   Urine Culture  Order: [id number] - Reflex for Order  [id number]   Status: Preliminary result     Visible to patient: No (not released)    Specimen Information: Urine, Midstream         2 Result Notes    Culture 50,000-100,000 CFU/mL Staphylococcus aureus Abnormal             Resulting Agency: [facility]           Specimen Collected: 04/06/22  1:04 AM Last Resulted: 04/07/22 12:13 PM

## 2022-04-08 RX ORDER — NITROFURANTOIN 25; 75 MG/1; MG/1
100 CAPSULE ORAL 2 TIMES DAILY
Qty: 10 CAPSULE | Refills: 0 | Status: SHIPPED | OUTPATIENT
Start: 2022-04-08 | End: 2022-04-13

## 2022-04-08 NOTE — TELEPHONE ENCOUNTER
[facility] () Emergency Department/Urgent Care Lab result notification     Patient/parent Name  Florentinone    JONATHON Assessment (Patient s current Symptoms), include time called.  Genitourinary symptoms:  Urgency  Fever:  No  Flank pain:  New back pain - not worsening - intermittent  Denies vomiting, nausea, hematuria  Urine:  Cloudy    Lab result (if applicable):  Final urine culture on 4/7/22 shows the presence of bacteria(s): 50,000 - 100,000 colonies/mL Staphylococcus aureus  [facility] Emergency Dept discharge antibiotic: None  Recommendations in treatment per [facility] ED lab result Urine Culture protocol.    RN Recommendations/Instructions per [place] ED lab result protocol  Patient notified of lab result and treatment recommendations.  Rx for Nitrofurantoin Macrocrystal-Monohydrate (Macrobid) 100 mg PO capsule, 1 capsule (100 mg) by mouth 2 times daily for 5 days sent to [Pharmacy - [facility] pharmacy, [place], [place]].  RN reviewed information about UTI Prevention, probiotic, pyelonephritis and when to follow up.  We discussed consent to communicate options she did says she didn't want to authorize her mom for consent to communicate.    Patient Education on preventing future UTI's.  1. Practice good personal hygiene. Wipe yourself from front to back after using the toilet. This helps keep bacteria from getting into the urethra. Keep the genital area clean and dry.  2. Drink plenty of fluids, such as water, juice, or other caffeine-free drinks.  Drink at least 6-8 glasses a day (1 1/2 to 2 1/2 liters), unless you must restrict fluids for other medical reasons. This will force the medicine (antibiotic) into your urinary system and flush the bacteria out of your body. Cranberry juice has been shown to help clear out the bacteria.  3. Empty your bladder. Always empty your bladder when you feel the urge to urinate. And always urinate before going to sleep. Urine that stays in  your bladder can lead to infection. Try to urinate before and after sex as well.  4. Wear cotton underwear and cotton-lined panty hose; avoid tight-fitting pants.  5. Use condoms during sex. These help prevent UTIs caused by sexually transmitted bacteria. Also, avoid using spermicides during sex. These can increase the risk of UTIs. Choose other forms of birth control instead. For women who tend to get UTIs after sex, a low-dose of a preventive antibiotic may be used. Be sure to discuss this option with your health care provider.  6. Follow up with your health care provider as directed. He or she may test to make sure the infection has cleared. If necessary, additional treatment may be started.       Please Contact your PCP clinic or return to the Emergency department if your:    Symptoms return.    Symptoms do not improve after 3 days on antibiotic.    Symptoms do not resolve after completing antibiotic.    Symptoms worsen or other concerning symptom's.    PCP follow-up Questions asked: YES       Malena Vieyra RN  [facility]  Emergency Dept Lab Result RN  # [phone]     Copy of Lab result   Urine Culture  Order: [id number] - Reflex for Order [id number]   Status: Final result     Visible to patient: No (inaccessible in [facility])    Specimen Information: Urine, Midstream         5 Result Notes    Culture 50,000-100,000 CFU/mL Staphylococcus aureus Abnormal             Resulting Agency: [facility]       Susceptibility      Staphylococcus aureus (1)    Antibiotic Interpretation Sensitivity  Method Status    Oxacillin Susceptible 0.5 ug/mL KELLEY Final     Oxacillin susceptible isolates are susceptible to cephalosporins (example: cefazolin and cephalexin) and beta lactam combination agents. Oxacillin resistant isolates are resistant to these agents.       Gentamicin Susceptible <=0.5 ug/mL KELLEY Final    Vancomycin Susceptible <=0.5 ug/mL KELLEY Final    Tetracycline Susceptible <=1.0 ug/mL  KELLEY Final    Nitrofurantoin Susceptible 32.0 ug/mL KELLEY Final    Trimethoprim/Sulfamethoxazole Susceptible <=0.5/9.5 ug/mL KELLEY Final          Specimen Collected: 04/06/22  1:04 AM Last Resulted: 04/07/22 11:14 PM

## 2022-05-20 ENCOUNTER — HOSPITAL ENCOUNTER (EMERGENCY)
Facility: CLINIC | Age: 22
Discharge: HOME | End: 2022-05-20
Attending: INTERNAL MEDICINE | Admitting: INTERNAL MEDICINE
Payer: COMMERCIAL

## 2022-05-20 ENCOUNTER — APPOINTMENT (OUTPATIENT)
Dept: MRI IMAGING | Facility: CLINIC | Age: 22
End: 2022-05-20
Attending: INTERNAL MEDICINE
Payer: COMMERCIAL

## 2022-05-20 VITALS
SYSTOLIC BLOOD PRESSURE: 115 MMHG | HEART RATE: 90 BPM | DIASTOLIC BLOOD PRESSURE: 63 MMHG | RESPIRATION RATE: 16 BRPM | OXYGEN SATURATION: 100 % | TEMPERATURE: 97.8 F

## 2022-05-20 DIAGNOSIS — Z32.01 PREGNANCY TEST POSITIVE: ICD-10-CM

## 2022-05-20 DIAGNOSIS — N30.00 ACUTE CYSTITIS WITHOUT HEMATURIA: ICD-10-CM

## 2022-05-20 DIAGNOSIS — M54.42 ACUTE MIDLINE LOW BACK PAIN WITH BILATERAL SCIATICA: ICD-10-CM

## 2022-05-20 DIAGNOSIS — M54.41 ACUTE MIDLINE LOW BACK PAIN WITH BILATERAL SCIATICA: ICD-10-CM

## 2022-05-20 LAB
ALBUMIN SERPL-MCNC: 3.6 G/DL (ref 3.4–5)
ALBUMIN UR-MCNC: 70 MG/DL
ALP SERPL-CCNC: 46 U/L (ref 40–150)
ALT SERPL W P-5'-P-CCNC: 12 U/L (ref 0–50)
ANION GAP SERPL CALCULATED.3IONS-SCNC: 7 MMOL/L (ref 3–14)
APPEARANCE UR: ABNORMAL
AST SERPL W P-5'-P-CCNC: 15 U/L (ref 0–45)
B-HCG SERPL-ACNC: 72 IU/L (ref 0–5)
BASOPHILS # BLD AUTO: 0 10E3/UL (ref 0–0.2)
BASOPHILS NFR BLD AUTO: 1 %
BILIRUB SERPL-MCNC: 0.4 MG/DL (ref 0.2–1.3)
BILIRUB UR QL STRIP: NEGATIVE
BUN SERPL-MCNC: 11 MG/DL (ref 7–30)
CALCIUM SERPL-MCNC: 9.3 MG/DL (ref 8.5–10.1)
CHLORIDE BLD-SCNC: 107 MMOL/L (ref 94–109)
CO2 SERPL-SCNC: 25 MMOL/L (ref 20–32)
COLOR UR AUTO: YELLOW
CREAT SERPL-MCNC: 0.75 MG/DL (ref 0.52–1.04)
CRP SERPL-MCNC: <2.9 MG/L (ref 0–8)
EOSINOPHIL # BLD AUTO: 0.1 10E3/UL (ref 0–0.7)
EOSINOPHIL NFR BLD AUTO: 3 %
ERYTHROCYTE [DISTWIDTH] IN BLOOD BY AUTOMATED COUNT: 14.8 % (ref 10–15)
GFR SERPL CREATININE-BSD FRML MDRD: >90 ML/MIN/1.73M2
GLUCOSE BLD-MCNC: 76 MG/DL (ref 70–99)
GLUCOSE UR STRIP-MCNC: NEGATIVE MG/DL
HCG SERPL QL: POSITIVE
HCT VFR BLD AUTO: 35.6 % (ref 35–47)
HGB BLD-MCNC: 11.3 G/DL (ref 11.7–15.7)
HGB UR QL STRIP: ABNORMAL
IMM GRANULOCYTES # BLD: 0 10E3/UL
IMM GRANULOCYTES NFR BLD: 0 %
KETONES UR STRIP-MCNC: NEGATIVE MG/DL
LEUKOCYTE ESTERASE UR QL STRIP: ABNORMAL
LIPASE SERPL-CCNC: 104 U/L (ref 73–393)
LYMPHOCYTES # BLD AUTO: 1.9 10E3/UL (ref 0.8–5.3)
LYMPHOCYTES NFR BLD AUTO: 45 %
MCH RBC QN AUTO: 24.2 PG (ref 26.5–33)
MCHC RBC AUTO-ENTMCNC: 31.7 G/DL (ref 31.5–36.5)
MCV RBC AUTO: 76 FL (ref 78–100)
MONOCYTES # BLD AUTO: 0.4 10E3/UL (ref 0–1.3)
MONOCYTES NFR BLD AUTO: 10 %
MUCOUS THREADS #/AREA URNS LPF: PRESENT /LPF
NEUTROPHILS # BLD AUTO: 1.7 10E3/UL (ref 1.6–8.3)
NEUTROPHILS NFR BLD AUTO: 41 %
NITRATE UR QL: POSITIVE
NRBC # BLD AUTO: 0 10E3/UL
NRBC BLD AUTO-RTO: 0 /100
PH UR STRIP: 7 [PH] (ref 5–7)
PLATELET # BLD AUTO: 281 10E3/UL (ref 150–450)
POTASSIUM BLD-SCNC: 3.8 MMOL/L (ref 3.4–5.3)
PROT SERPL-MCNC: 7.9 G/DL (ref 6.8–8.8)
RBC # BLD AUTO: 4.67 10E6/UL (ref 3.8–5.2)
RBC URINE: >182 /HPF
SODIUM SERPL-SCNC: 139 MMOL/L (ref 133–144)
SP GR UR STRIP: 1.03 (ref 1–1.03)
SQUAMOUS EPITHELIAL: 2 /HPF
UROBILINOGEN UR STRIP-MCNC: NORMAL MG/DL
WBC # BLD AUTO: 4 10E3/UL (ref 4–11)
WBC URINE: >182 /HPF

## 2022-05-20 PROCEDURE — 99284 EMERGENCY DEPT VISIT MOD MDM: CPT | Performed by: INTERNAL MEDICINE

## 2022-05-20 PROCEDURE — 36415 COLL VENOUS BLD VENIPUNCTURE: CPT | Performed by: INTERNAL MEDICINE

## 2022-05-20 PROCEDURE — 99284 EMERGENCY DEPT VISIT MOD MDM: CPT | Mod: 25 | Performed by: INTERNAL MEDICINE

## 2022-05-20 PROCEDURE — 83690 ASSAY OF LIPASE: CPT | Performed by: INTERNAL MEDICINE

## 2022-05-20 PROCEDURE — 84702 CHORIONIC GONADOTROPIN TEST: CPT | Performed by: INTERNAL MEDICINE

## 2022-05-20 PROCEDURE — 250N000013 HC RX MED GY IP 250 OP 250 PS 637: Performed by: INTERNAL MEDICINE

## 2022-05-20 PROCEDURE — 80053 COMPREHEN METABOLIC PANEL: CPT | Performed by: INTERNAL MEDICINE

## 2022-05-20 PROCEDURE — 85004 AUTOMATED DIFF WBC COUNT: CPT | Performed by: INTERNAL MEDICINE

## 2022-05-20 PROCEDURE — 72148 MRI LUMBAR SPINE W/O DYE: CPT

## 2022-05-20 PROCEDURE — 86140 C-REACTIVE PROTEIN: CPT | Performed by: INTERNAL MEDICINE

## 2022-05-20 PROCEDURE — 84703 CHORIONIC GONADOTROPIN ASSAY: CPT | Performed by: INTERNAL MEDICINE

## 2022-05-20 PROCEDURE — 87086 URINE CULTURE/COLONY COUNT: CPT | Performed by: INTERNAL MEDICINE

## 2022-05-20 PROCEDURE — 81001 URINALYSIS AUTO W/SCOPE: CPT | Performed by: INTERNAL MEDICINE

## 2022-05-20 RX ORDER — CYCLOBENZAPRINE HCL 10 MG
10 TABLET ORAL 3 TIMES DAILY PRN
Qty: 20 TABLET | Refills: 0 | Status: SHIPPED | OUTPATIENT
Start: 2022-05-20 | End: 2022-05-26

## 2022-05-20 RX ORDER — CEFDINIR 300 MG/1
300 CAPSULE ORAL 2 TIMES DAILY
Qty: 14 CAPSULE | Refills: 0 | Status: SHIPPED | OUTPATIENT
Start: 2022-05-20 | End: 2022-05-27

## 2022-05-20 RX ORDER — CYCLOBENZAPRINE HCL 10 MG
10 TABLET ORAL ONCE
Status: COMPLETED | OUTPATIENT
Start: 2022-05-20 | End: 2022-05-20

## 2022-05-20 RX ORDER — HYDROCODONE BITARTRATE AND ACETAMINOPHEN 5; 325 MG/1; MG/1
1 TABLET ORAL ONCE
Status: COMPLETED | OUTPATIENT
Start: 2022-05-20 | End: 2022-05-20

## 2022-05-20 RX ORDER — IBUPROFEN 600 MG/1
600 TABLET, FILM COATED ORAL EVERY 6 HOURS PRN
Qty: 32 TABLET | Refills: 0 | Status: ON HOLD | OUTPATIENT
Start: 2022-05-20 | End: 2023-06-28

## 2022-05-20 RX ADMIN — CYCLOBENZAPRINE 10 MG: 10 TABLET, FILM COATED ORAL at 17:33

## 2022-05-20 RX ADMIN — HYDROCODONE BITARTRATE AND ACETAMINOPHEN 1 TABLET: 5; 325 TABLET ORAL at 17:33

## 2022-05-20 ASSESSMENT — ENCOUNTER SYMPTOMS
ADENOPATHY: 0
NECK PAIN: 0
DIFFICULTY URINATING: 0
WEAKNESS: 0
NUMBNESS: 1
COUGH: 0
BACK PAIN: 1
CONFUSION: 0
SHORTNESS OF BREATH: 0
WHEEZING: 0
PALPITATIONS: 0
ABDOMINAL PAIN: 0
CHILLS: 0
LIGHT-HEADEDNESS: 0
FEVER: 0
SPEECH DIFFICULTY: 0

## 2022-05-20 NOTE — ED PROVIDER NOTES
ED Provider Note  [facility]      History     Chief Complaint   Patient presents with     Back Pain     Bent over and picked up a heavy object: 7 days of pain: numbness in lower back; however has numbness in fingers and legs; no loss of fxns.    presents with  HPI  Kamransergeiloly Palomo is a 21 year old female who presents with lower back pain radiating to the front of both thighs for the past week. She may have picked up a heavy object, but is uncertain if this precipitated the pain. Pain radiates up the back. She has tingling in her feet and hands. She has no headache, URI symptoms, neck pain, fever, chills, URI symptoms, cough, sputum, shortness of breath, chest pain, abdominal pain or difficulty with urination. LNMP 1 month ago.    Past Medical History:   Diagnosis Date     Constipation      Depressive disorder      Migraines        Past Surgical History:   Procedure Laterality Date     BIOPSY LYMPH NODE CERVICAL       DILATION AND CURETTAGE SUCTION N/A 9/7/2021    Procedure: DILATION AND CURETTAGE, UTERUS, USING SUCTION;  Surgeon: Yessi Mcnamara MD;  Location: [facility] OR     LAPAROSCOPIC APPENDECTOMY N/A 1/4/2017    Procedure: LAPAROSCOPIC APPENDECTOMY;  Surgeon: Erick Casper MD;  Location: [facility] OR     TONSILLECTOMY, ADENOIDECTOMY WITH SHAVER, COMBINED         Family History   Problem Relation Age of Onset     Depression Mother      Substance Abuse Mother         sober 6 yrs     Depression Father      Mental Illness Father         anger issues     Substance Abuse Father         current user     Depression Maternal Grandmother      Substance Abuse Maternal Grandmother      Mental Illness Paternal Grandmother      Substance Abuse Paternal Grandmother      Mental Illness Paternal Grandfather      Substance Abuse Paternal Grandfather      Mental Illness Paternal Aunt      Substance Abuse Paternal Aunt      Mental Illness Paternal Uncle      Substance Abuse Paternal Uncle         Social History     Tobacco Use     Smoking status: Current Some Day Smoker     Packs/day: 1.00     Types: Cigarettes     Last attempt to quit: 5/15/2020     Years since quittin.0     Smokeless tobacco: Never Used   Substance Use Topics     Alcohol use: Not Currently     Alcohol/week: 0.0 standard drinks          Review of Systems   Constitutional: Negative for chills and fever.   HENT: Negative for congestion.    Eyes: Negative for visual disturbance.   Respiratory: Negative for cough, shortness of breath and wheezing.    Cardiovascular: Negative for chest pain, palpitations and leg swelling.   Gastrointestinal: Negative for abdominal pain.   Genitourinary: Negative for difficulty urinating.   Musculoskeletal: Positive for back pain. Negative for neck pain.   Neurological: Positive for numbness. Negative for speech difficulty, weakness and light-headedness.   Hematological: Negative for adenopathy.   Psychiatric/Behavioral: Negative for confusion.         Physical Exam   BP: 115/72  Pulse: 91  Temp: 98.8  F (37.1  C)  Resp: 16  SpO2: 98 %  Physical Exam  Vitals and nursing note reviewed.   Constitutional:       General: She is not in acute distress.     Appearance: Normal appearance.   HENT:      Head: Normocephalic and atraumatic.      Right Ear: External ear normal.      Left Ear: External ear normal.      Nose: Nose normal.      Mouth/Throat:      Mouth: Mucous membranes are moist.   Eyes:      Extraocular Movements: Extraocular movements intact.      Pupils: Pupils are equal, round, and reactive to light.   Cardiovascular:      Rate and Rhythm: Normal rate and regular rhythm.      Heart sounds: No murmur heard.  Pulmonary:      Effort: Pulmonary effort is normal.      Breath sounds: Normal breath sounds.   Abdominal:      Tenderness: There is no abdominal tenderness.   Musculoskeletal:      Cervical back: Normal, normal range of motion and neck supple. No tenderness.      Thoracic back: Tenderness  present. No deformity.      Lumbar back: Tenderness present. No deformity. Decreased range of motion.      Right lower leg: No edema.      Left lower leg: No edema.   Skin:     General: Skin is warm and dry.   Neurological:      General: No focal deficit present.      Mental Status: She is alert and oriented to person, place, and time.      Cranial Nerves: No cranial nerve deficit.      Sensory: No sensory deficit.      Motor: No weakness.      Deep Tendon Reflexes: Reflexes normal.      Comments: Subjective tingling in hands and feet, not in anatomic distribution   Psychiatric:         Mood and Affect: Mood normal.         Behavior: Behavior normal.         ED Course      Procedures          Labs/Imaging    Results for orders placed or performed during the hospital encounter of 05/20/22 (from the past 24 hour(s))   CBC with platelets differential    Narrative    The following orders were created for panel order CBC with platelets differential.  Procedure                               Abnormality         Status                     ---------                               -----------         ------                     CBC with platelets and d...[[id number]]  Abnormal            Final result                 Please view results for these tests on the individual orders.   Comprehensive metabolic panel   Result Value Ref Range    Sodium 139 133 - 144 mmol/L    Potassium 3.8 3.4 - 5.3 mmol/L    Chloride 107 94 - 109 mmol/L    Carbon Dioxide (CO2) 25 20 - 32 mmol/L    Anion Gap 7 3 - 14 mmol/L    Urea Nitrogen 11 7 - 30 mg/dL    Creatinine 0.75 0.52 - 1.04 mg/dL    Calcium 9.3 8.5 - 10.1 mg/dL    Glucose 76 70 - 99 mg/dL    Alkaline Phosphatase 46 40 - 150 U/L    AST 15 0 - 45 U/L    ALT 12 0 - 50 U/L    Protein Total 7.9 6.8 - 8.8 g/dL    Albumin 3.6 3.4 - 5.0 g/dL    Bilirubin Total 0.4 0.2 - 1.3 mg/dL    GFR Estimate >90 >60 mL/min/1.73m2   Lipase   Result Value Ref Range    Lipase 104 73 - 393 U/L   CRP inflammation    Result Value Ref Range    CRP Inflammation <2.9 0.0 - 8.0 mg/L   HCG qualitative Blood   Result Value Ref Range    hCG Serum Qualitative Positive (A) Negative   CBC with platelets and differential   Result Value Ref Range    WBC Count 4.0 4.0 - 11.0 10e3/uL    RBC Count 4.67 3.80 - 5.20 10e6/uL    Hemoglobin 11.3 (L) 11.7 - 15.7 g/dL    Hematocrit 35.6 35.0 - 47.0 %    MCV 76 (L) 78 - 100 fL    MCH 24.2 (L) 26.5 - 33.0 pg    MCHC 31.7 31.5 - 36.5 g/dL    RDW 14.8 10.0 - 15.0 %    Platelet Count 281 150 - 450 10e3/uL    % Neutrophils 41 %    % Lymphocytes 45 %    % Monocytes 10 %    % Eosinophils 3 %    % Basophils 1 %    % Immature Granulocytes 0 %    NRBCs per 100 WBC 0 <1 /100    Absolute Neutrophils 1.7 1.6 - 8.3 10e3/uL    Absolute Lymphocytes 1.9 0.8 - 5.3 10e3/uL    Absolute Monocytes 0.4 0.0 - 1.3 10e3/uL    Absolute Eosinophils 0.1 0.0 - 0.7 10e3/uL    Absolute Basophils 0.0 0.0 - 0.2 10e3/uL    Absolute Immature Granulocytes 0.0 <=0.4 10e3/uL    Absolute NRBCs 0.0 10e3/uL   HCG quantitative pregnancy   Result Value Ref Range    hCG Quantitative 72 (H) 0 - 5 IU/L   UA with Microscopic reflex to Culture    Specimen: Urine, Midstream   Result Value Ref Range    Color Urine Yellow Colorless, Straw, Light Yellow, Yellow    Appearance Urine Slightly Cloudy (A) Clear    Glucose Urine Negative Negative mg/dL    Bilirubin Urine Negative Negative    Ketones Urine Negative Negative mg/dL    Specific Gravity Urine 1.026 1.003 - 1.035    Blood Urine Large (A) Negative    pH Urine 7.0 5.0 - 7.0    Protein Albumin Urine 70  (A) Negative mg/dL    Urobilinogen Urine Normal Normal, 2.0 mg/dL    Nitrite Urine Positive (A) Negative    Leukocyte Esterase Urine Large (A) Negative    Mucus Urine Present (A) None Seen /LPF    RBC Urine >182 (H) <=2 /HPF    WBC Urine >182 (H) <=5 /HPF    Squamous Epithelials Urine 2 (H) <=1 /HPF    Narrative    Urine Culture ordered based on laboratory criteria   Lumbar spine MRI w/o contrast     Narrative    EXAM: MR LUMBAR SPINE W/O CONTRAST  LOCATION: [facility]  DATE/TIME: 5/20/2022 8:59 PM    INDICATION: Low back pain, radiculopathy  COMPARISON: None.  TECHNIQUE: Routine Lumbar Spine MRI without IV contrast.    FINDINGS:   Nomenclature is based on 5 lumbar type vertebral bodies. 2 mm retrolisthesis L5 on S1. Alignment is otherwise normal. No marrow edema. Normal distal spinal cord and cauda equina with conus medullaris at L1. No extraspinal abnormality. Unremarkable   visualized bony pelvis.    T12-L1: Normal disc height and signal. No herniation. Normal facets. No spinal canal or neural foraminal stenosis.     L1-L2: Normal disc height and signal. No herniation. Normal facets. No spinal canal or neural foraminal stenosis.    L2-L3: Normal disc height and signal. No herniation. Normal facets. No spinal canal or neural foraminal stenosis.     L3-L4: Normal disc height and signal. No herniation. Normal facets. No spinal canal or neural foraminal stenosis.    L4-L5: Normal disc height and signal. No herniation. Normal facets. No spinal canal or neural foraminal stenosis.    L5-S1: Mild loss of disc height with preservation of T2 signal. No disc bulging. No herniation. Disc height and signal. No herniation. Normal facets. No spinal canal or neural foraminal stenosis.      Impression    IMPRESSION:  1.  Mild L5-S1 degenerative disc disease. Lumbar spine otherwise unremarkable.       I discussed that we have limited data on the safety of MRI during the first trimester of pregnancy. The patient states she would like to proceed with the study. (She states she intends to terminate the pregnancy in any case).    Medications   HYDROcodone-acetaminophen (NORCO) 5-325 MG per tablet 1 tablet (1 tablet Oral Given 5/20/22 1733)   cyclobenzaprine (FLEXERIL) tablet 10 mg (10 mg Oral Given 5/20/22 1733)        Assessments & Plan (with Medical Decision Making)    Impression:  Young woman presents with lower back pain with radiation upward and into the lower extremities over the past 5 days. She is neurologically intact. On MRI there are mild degenerative changes without evidence of acute disc rupture or foraminal stenosis. She has no evidence of cauda equina syndrome. She has incidentally identified UTI and very early pregnancy. We discussed these results. She has had some intermittent blood in the urine for a few days. She states she is certain she will terminate the pregnancy. She will be treated with cefdinir for UTI. She can use flexeril and ibuprofen for the back pain. All medications are category B in pregnancy. She should follow up with her primary clinic.    I have reviewed the nursing notes. I have reviewed the findings, diagnosis, plan and need for follow up with the patient.    New Prescriptions    CEFDINIR (OMNICEF) 300 MG CAPSULE    Take 1 capsule (300 mg) by mouth 2 times daily for 7 days    CYCLOBENZAPRINE (FLEXERIL) 10 MG TABLET    Take 1 tablet (10 mg) by mouth 3 times daily as needed for muscle spasms    IBUPROFEN (ADVIL/MOTRIN) 600 MG TABLET    Take 1 tablet (600 mg) by mouth every 6 hours as needed for moderate pain       Final diagnoses:   Acute midline low back pain with bilateral sciatica   Acute cystitis without hematuria   Pregnancy test positive       --  Neo Sethi  [facility] EMERGENCY DEPARTMENT  5/20/2022     Neo Sethi MD  05/20/22 4515

## 2022-05-20 NOTE — ED TRIAGE NOTES
Triage Assessment     Row Name 05/20/22 8751       Triage Assessment (Adult)    Airway WDL WDL       Respiratory WDL    Respiratory WDL WDL       Skin Circulation/Temperature WDL    Skin Circulation/Temperature WDL WDL       Cardiac WDL    Cardiac WDL WDL       Peripheral/Neurovascular WDL    Peripheral Neurovascular WDL WDL       Cognitive/Neuro/Behavioral WDL    Cognitive/Neuro/Behavioral WDL WDL

## 2022-05-21 NOTE — DISCHARGE INSTRUCTIONS
Cefdinir 300 mg twice/ day for 7 days.  Ibuprofen 600 mg every 6 hours as needed for pain.  Flexeril 10 mg 3 times/ day as needed for pain.  Follow up with [facility].

## 2022-05-22 LAB — BACTERIA UR CULT: NORMAL

## 2022-05-22 NOTE — RESULT ENCOUNTER NOTE
Final urine culture report is negative.  Adult Negative Urine culture parameters per protocol: Any # Urogenital single or mixed organism, <10,000 col/ml single organism (cath/midstream), and > 3 organisms (No susceptibilities performed).  [facility] Emergency Dept discharge antibiotic prescribed (If applicable): Cefdinir  Treatment recommendations per [facility] ED Lab Result Urine Culture protocol.

## 2022-06-24 ENCOUNTER — HOSPITAL ENCOUNTER (EMERGENCY)
Facility: CLINIC | Age: 22
Discharge: HOME | End: 2022-06-24
Attending: EMERGENCY MEDICINE | Admitting: EMERGENCY MEDICINE
Payer: COMMERCIAL

## 2022-06-24 ENCOUNTER — APPOINTMENT (OUTPATIENT)
Dept: ULTRASOUND IMAGING | Facility: CLINIC | Age: 22
End: 2022-06-24
Attending: EMERGENCY MEDICINE
Payer: COMMERCIAL

## 2022-06-24 VITALS
RESPIRATION RATE: 20 BRPM | TEMPERATURE: 98.4 F | SYSTOLIC BLOOD PRESSURE: 121 MMHG | DIASTOLIC BLOOD PRESSURE: 85 MMHG | OXYGEN SATURATION: 100 % | HEART RATE: 81 BPM

## 2022-06-24 DIAGNOSIS — O03.9 SPONTANEOUS ABORTION: ICD-10-CM

## 2022-06-24 LAB
ANION GAP SERPL CALCULATED.3IONS-SCNC: 6 MMOL/L (ref 3–14)
B-HCG SERPL-ACNC: ABNORMAL IU/L (ref 0–5)
BASOPHILS # BLD AUTO: 0 10E3/UL (ref 0–0.2)
BASOPHILS NFR BLD AUTO: 0 %
BUN SERPL-MCNC: 7 MG/DL (ref 7–30)
CALCIUM SERPL-MCNC: 8.7 MG/DL (ref 8.5–10.1)
CHLORIDE BLD-SCNC: 106 MMOL/L (ref 94–109)
CLUE CELLS: ABNORMAL
CO2 SERPL-SCNC: 23 MMOL/L (ref 20–32)
CREAT SERPL-MCNC: 0.61 MG/DL (ref 0.52–1.04)
EOSINOPHIL # BLD AUTO: 0.1 10E3/UL (ref 0–0.7)
EOSINOPHIL NFR BLD AUTO: 1 %
ERYTHROCYTE [DISTWIDTH] IN BLOOD BY AUTOMATED COUNT: 15.7 % (ref 10–15)
FLUAV RNA SPEC QL NAA+PROBE: NEGATIVE
FLUBV RNA RESP QL NAA+PROBE: NEGATIVE
GFR SERPL CREATININE-BSD FRML MDRD: >90 ML/MIN/1.73M2
GLUCOSE BLD-MCNC: 94 MG/DL (ref 70–99)
HCT VFR BLD AUTO: 32.4 % (ref 35–47)
HGB BLD-MCNC: 10.1 G/DL (ref 11.7–15.7)
IMM GRANULOCYTES # BLD: 0 10E3/UL
IMM GRANULOCYTES NFR BLD: 0 %
LYMPHOCYTES # BLD AUTO: 2 10E3/UL (ref 0.8–5.3)
LYMPHOCYTES NFR BLD AUTO: 30 %
MCH RBC QN AUTO: 23.9 PG (ref 26.5–33)
MCHC RBC AUTO-ENTMCNC: 31.2 G/DL (ref 31.5–36.5)
MCV RBC AUTO: 77 FL (ref 78–100)
MONOCYTES # BLD AUTO: 0.5 10E3/UL (ref 0–1.3)
MONOCYTES NFR BLD AUTO: 7 %
NEUTROPHILS # BLD AUTO: 4.1 10E3/UL (ref 1.6–8.3)
NEUTROPHILS NFR BLD AUTO: 62 %
NRBC # BLD AUTO: 0 10E3/UL
NRBC BLD AUTO-RTO: 0 /100
PLATELET # BLD AUTO: 240 10E3/UL (ref 150–450)
POTASSIUM BLD-SCNC: 3.7 MMOL/L (ref 3.4–5.3)
RBC # BLD AUTO: 4.22 10E6/UL (ref 3.8–5.2)
RSV RNA SPEC NAA+PROBE: NEGATIVE
SARS-COV-2 RNA RESP QL NAA+PROBE: NEGATIVE
SODIUM SERPL-SCNC: 135 MMOL/L (ref 133–144)
TRICHOMONAS, WET PREP: ABNORMAL
WBC # BLD AUTO: 6.7 10E3/UL (ref 4–11)
WBC'S/HIGH POWER FIELD, WET PREP: ABNORMAL
YEAST, WET PREP: ABNORMAL

## 2022-06-24 PROCEDURE — 87491 CHLMYD TRACH DNA AMP PROBE: CPT | Performed by: EMERGENCY MEDICINE

## 2022-06-24 PROCEDURE — 96374 THER/PROPH/DIAG INJ IV PUSH: CPT

## 2022-06-24 PROCEDURE — 85025 COMPLETE CBC W/AUTO DIFF WBC: CPT | Performed by: EMERGENCY MEDICINE

## 2022-06-24 PROCEDURE — 87210 SMEAR WET MOUNT SALINE/INK: CPT | Performed by: EMERGENCY MEDICINE

## 2022-06-24 PROCEDURE — 250N000013 HC RX MED GY IP 250 OP 250 PS 637: Performed by: EMERGENCY MEDICINE

## 2022-06-24 PROCEDURE — C9803 HOPD COVID-19 SPEC COLLECT: HCPCS

## 2022-06-24 PROCEDURE — 99285 EMERGENCY DEPT VISIT HI MDM: CPT | Mod: 25

## 2022-06-24 PROCEDURE — 87637 SARSCOV2&INF A&B&RSV AMP PRB: CPT | Performed by: EMERGENCY MEDICINE

## 2022-06-24 PROCEDURE — 250N000011 HC RX IP 250 OP 636: Performed by: EMERGENCY MEDICINE

## 2022-06-24 PROCEDURE — 87591 N.GONORRHOEAE DNA AMP PROB: CPT | Performed by: EMERGENCY MEDICINE

## 2022-06-24 PROCEDURE — 84702 CHORIONIC GONADOTROPIN TEST: CPT | Performed by: EMERGENCY MEDICINE

## 2022-06-24 PROCEDURE — 76801 OB US < 14 WKS SINGLE FETUS: CPT

## 2022-06-24 PROCEDURE — 36415 COLL VENOUS BLD VENIPUNCTURE: CPT | Performed by: EMERGENCY MEDICINE

## 2022-06-24 PROCEDURE — 82310 ASSAY OF CALCIUM: CPT | Performed by: EMERGENCY MEDICINE

## 2022-06-24 RX ORDER — MORPHINE SULFATE 4 MG/ML
4 INJECTION, SOLUTION INTRAMUSCULAR; INTRAVENOUS ONCE
Status: COMPLETED | OUTPATIENT
Start: 2022-06-24 | End: 2022-06-24

## 2022-06-24 RX ORDER — HYDROCODONE BITARTRATE AND ACETAMINOPHEN 5; 325 MG/1; MG/1
1 TABLET ORAL EVERY 6 HOURS PRN
Qty: 10 TABLET | Refills: 0 | Status: SHIPPED | OUTPATIENT
Start: 2022-06-24 | End: 2022-06-27

## 2022-06-24 RX ORDER — HYDROCODONE BITARTRATE AND ACETAMINOPHEN 5; 325 MG/1; MG/1
1 TABLET ORAL ONCE
Status: COMPLETED | OUTPATIENT
Start: 2022-06-24 | End: 2022-06-24

## 2022-06-24 RX ADMIN — HYDROCODONE BITARTRATE AND ACETAMINOPHEN 1 TABLET: 5; 325 TABLET ORAL at 05:44

## 2022-06-24 RX ADMIN — MORPHINE SULFATE 4 MG: 4 INJECTION INTRAVENOUS at 05:00

## 2022-06-24 ASSESSMENT — ENCOUNTER SYMPTOMS
SHORTNESS OF BREATH: 0
SORE THROAT: 0
ABDOMINAL PAIN: 0
CHILLS: 0
FEVER: 0
COUGH: 1

## 2022-06-24 NOTE — ED TRIAGE NOTES
"Pt states having vaginal bleeding tonight. Pt states currently approximately 6 weeks gestation. Pt refuses to answer most questions in triage stating \"something is really bad I don't have time to do all these questions and things. I need to be seen right away, like right now.\" ABCs intact GCS 15     Triage Assessment     Row Name 06/24/22 0331       Triage Assessment (Adult)    Airway WDL WDL       Respiratory WDL    Respiratory WDL WDL       Skin Circulation/Temperature WDL    Skin Circulation/Temperature WDL WDL       Cardiac WDL    Cardiac WDL WDL       Peripheral/Neurovascular WDL    Peripheral Neurovascular WDL WDL       Cognitive/Neuro/Behavioral WDL    Cognitive/Neuro/Behavioral WDL WDL              "

## 2022-06-24 NOTE — DISCHARGE INSTRUCTIONS
Follow-up with your gynecologist in 3 days for recheck of your hormone levels.    Return to the ER for fever, increased bleeding, dizziness, or other concerns.    Do not drive, use machinery, use alcohol, use other sedating medications, or use any medication that also contains acetaminophen (Tylenol) while taking Norco.

## 2022-06-24 NOTE — ED PROVIDER NOTES
History     Chief Complaint:  Vaginal Bleeding - Pregnant       HPI   Maynor Palomo is a 22 year old female who presents with vaginal bleeding.  She is  approximately 7 weeks pregnant.  She had an ultrasound last week that reportedly was normal.  He has had URI symptoms for last 3 days.  No vomiting.  Yesterday evening she began to have vaginal cramping and spotting.  She feels that her bleeding is heavier than her normal menstrual period.  She is not lightheaded and has not had syncope.  No fever.  No dysuria.  Her 2 prior pregnancies went to full-term and she delivered vaginally.    ROS:  Review of Systems   Constitutional: Negative for chills and fever.   HENT: Positive for congestion. Negative for sore throat.    Respiratory: Positive for cough. Negative for shortness of breath.    Cardiovascular: Negative for chest pain.   Gastrointestinal: Negative for abdominal pain.   Genitourinary: Positive for pelvic pain and vaginal bleeding.          Allergies:  Lactose  Naproxen  Penicillins     Medications:    HYDROcodone-acetaminophen (NORCO) 5-325 MG tablet  cetirizine (ZYRTEC) 10 MG tablet  ibuprofen (ADVIL/MOTRIN) 600 MG tablet        Past Medical History:    Past Medical History:   Diagnosis Date     Constipation      Depressive disorder      Migraines        Past Surgical History:    Past Surgical History:   Procedure Laterality Date     BIOPSY LYMPH NODE CERVICAL       DILATION AND CURETTAGE SUCTION N/A 2021    Procedure: DILATION AND CURETTAGE, UTERUS, USING SUCTION;  Surgeon: Yessi Mcnamara MD;  Location: [facility]     LAPAROSCOPIC APPENDECTOMY N/A 2017    Procedure: LAPAROSCOPIC APPENDECTOMY;  Surgeon: Erick Casper MD;  Location: [facility]     TONSILLECTOMY, ADENOIDECTOMY WITH SHAVER, COMBINED          Family History:    family history includes Depression in her father, maternal grandmother, and mother; Mental Illness in her father, paternal aunt, paternal grandfather, paternal  grandmother, and paternal uncle; Substance Abuse in her father, maternal grandmother, mother, paternal aunt, paternal grandfather, paternal grandmother, and paternal uncle.    Social History:   reports that she has been smoking cigarettes. She has been smoking about 1.00 pack per day. She has never used smokeless tobacco. She reports previous alcohol use. She reports previous drug use. Drug: Marijuana.  PCP: [place], [facility]     Physical Exam     Patient Vitals for the past 24 hrs:   BP Temp Temp src Pulse Resp SpO2   06/24/22 0400 -- -- -- -- -- 100 %   06/24/22 0345 115/88 -- -- 89 -- 100 %   06/24/22 0331 (!) 121/95 98.4  F (36.9  C) Oral 91 20 97 %        Physical Exam  Constitutional:       General: She is not in acute distress.     Appearance: She is not diaphoretic.   HENT:      Head: Atraumatic.      Mouth/Throat:      Pharynx: No oropharyngeal exudate.   Eyes:      General: No scleral icterus.     Pupils: Pupils are equal, round, and reactive to light.   Cardiovascular:      Rate and Rhythm: Normal rate and regular rhythm.      Heart sounds: Normal heart sounds.   Pulmonary:      Effort: No respiratory distress.      Breath sounds: Normal breath sounds.   Abdominal:      General: Bowel sounds are normal.      Palpations: Abdomen is soft.      Tenderness: There is no abdominal tenderness.   Genitourinary:     Comments: The cervix is open.  All amount of blood pooled in the posterior vaginal vault with no active bleeding.  Musculoskeletal:         General: No tenderness.   Skin:     General: Skin is warm.      Capillary Refill: Capillary refill takes less than 2 seconds.      Findings: No rash.   Neurological:      General: No focal deficit present.   Psychiatric:         Mood and Affect: Mood normal.         Behavior: Behavior normal.           Emergency Department Course   ECG:  ECG results from 03/14/16   EKG 12-lead, complete     Value    Interpretation ECG Click View Image link  to view waveform and result       Imaging:  US OB <14 Weeks W Transvaginal   Final Result   IMPRESSION:    1.  Left ovary not visualized.      2.  No yolk sac, embryo, or gestational sac seen within the uterus. Repeat hCG and ultrasound in 2-4 weeks may be of benefit, if clinically indicated.                     Report per radiology    Laboratory:  Labs Ordered and Resulted from Time of ED Arrival to Time of ED Departure   HCG QUANTITATIVE PREGNANCY - Abnormal       Result Value    hCG Quantitative 44,936 (*)    CBC WITH PLATELETS AND DIFFERENTIAL - Abnormal    WBC Count 6.7      RBC Count 4.22      Hemoglobin 10.1 (*)     Hematocrit 32.4 (*)     MCV 77 (*)     MCH 23.9 (*)     MCHC 31.2 (*)     RDW 15.7 (*)     Platelet Count 240      % Neutrophils 62      % Lymphocytes 30      % Monocytes 7      % Eosinophils 1      % Basophils 0      % Immature Granulocytes 0      NRBCs per 100 WBC 0      Absolute Neutrophils 4.1      Absolute Lymphocytes 2.0      Absolute Monocytes 0.5      Absolute Eosinophils 0.1      Absolute Basophils 0.0      Absolute Immature Granulocytes 0.0      Absolute NRBCs 0.0     WET PREPARATION - Abnormal    Trichomonas Absent      Yeast Absent      Clue Cells Absent      WBCs/high power field 1+ (*)    BASIC METABOLIC PANEL - Normal    Sodium 135      Potassium 3.7      Chloride 106      Carbon Dioxide (CO2) 23      Anion Gap 6      Urea Nitrogen 7      Creatinine 0.61      Calcium 8.7      Glucose 94      GFR Estimate >90     INFLUENZA A/B & SARS-COV2 PCR MULTIPLEX - Normal    Influenza A PCR Negative      Influenza B PCR Negative      RSV PCR Negative      SARS CoV2 PCR Negative     ROUTINE UA WITH MICROSCOPIC REFLEX TO CULTURE   CHLAMYDIA TRACHOMATIS PCR   NEISSERIA GONORRHOEAE PCR      Interventions:  Medications   morphine (PF) injection 4 mg (4 mg Intravenous Given 6/24/22 0500)   HYDROcodone-acetaminophen (NORCO) 5-325 MG per tablet 1 tablet (1 tablet Oral Given 6/24/22 4565)         Disposition:  The patient was discharged to home.     Impression & Plan        Medical Decision Making:  This patient presents to the ED with what appears to be spontaneous .  Blood type is noted to be A positive.    Pelvic exam shows an open cervix but no active bleeding.  Shortly after arrival in the ED, the patient passed a large amount of clots with some tissue present.  It was unclear whether this was a gestational sac.  The patient was adamant that she did not want it sent to the laboratory and demanded that it be placed directly under custody.  I advised her that hospital policy is to send the products of conception to the laboratory for testing and that there is a procedure in place to return the products to her should that be her wish.  The patient refused to give the products of conception to the staff.    Ultrasound does not demonstrate any residual products of conception.  She is hemodynamically stable.  Her pain was treated.  She was advised to follow in 3 days through her gynecology clinic for recheck of her quantitative hCG to ensure this is downtrending.  We discussed return precautions.    The patient had noted URI symptoms.  She does not appear to exhibit any remaining symptoms now.  COVID test negative.        Diagnosis:    ICD-10-CM    1. Spontaneous   O03.9         Discharge Medications:  New Prescriptions    HYDROCODONE-ACETAMINOPHEN (NORCO) 5-325 MG TABLET    Take 1 tablet by mouth every 6 hours as needed for pain        2022   Rohit Emery, *      Rohit Emery MD  22 [id number]

## 2022-06-25 LAB
C TRACH DNA SPEC QL NAA+PROBE: NEGATIVE
N GONORRHOEA DNA SPEC QL NAA+PROBE: NEGATIVE

## 2022-08-27 ENCOUNTER — HOSPITAL ENCOUNTER (EMERGENCY)
Facility: CLINIC | Age: 22
Discharge: LEFT BEFORE DISCHARGE | End: 2022-08-27
Admitting: EMERGENCY MEDICINE
Payer: COMMERCIAL

## 2022-08-27 VITALS
DIASTOLIC BLOOD PRESSURE: 81 MMHG | RESPIRATION RATE: 20 BRPM | SYSTOLIC BLOOD PRESSURE: 108 MMHG | TEMPERATURE: 97.5 F | OXYGEN SATURATION: 100 % | HEART RATE: 82 BPM

## 2022-08-27 LAB
ALBUMIN UR-MCNC: 20 MG/DL
APPEARANCE UR: CLEAR
BILIRUB UR QL STRIP: NEGATIVE
COLOR UR AUTO: ABNORMAL
GLUCOSE UR STRIP-MCNC: NEGATIVE MG/DL
HCG UR QL: NEGATIVE
HGB UR QL STRIP: NEGATIVE
KETONES UR STRIP-MCNC: NEGATIVE MG/DL
LEUKOCYTE ESTERASE UR QL STRIP: ABNORMAL
MUCOUS THREADS #/AREA URNS LPF: PRESENT /LPF
NITRATE UR QL: NEGATIVE
PH UR STRIP: 7 [PH] (ref 5–7)
RBC URINE: 1 /HPF
SP GR UR STRIP: 1.03 (ref 1–1.03)
SQUAMOUS EPITHELIAL: 6 /HPF
UROBILINOGEN UR STRIP-MCNC: NORMAL MG/DL
WBC URINE: 81 /HPF

## 2022-08-27 PROCEDURE — 81001 URINALYSIS AUTO W/SCOPE: CPT | Performed by: EMERGENCY MEDICINE

## 2022-08-27 PROCEDURE — 87086 URINE CULTURE/COLONY COUNT: CPT | Performed by: EMERGENCY MEDICINE

## 2022-08-27 PROCEDURE — 81025 URINE PREGNANCY TEST: CPT | Performed by: EMERGENCY MEDICINE

## 2022-08-27 PROCEDURE — 999N000104 HC STATISTIC NO CHARGE

## 2022-08-27 ASSESSMENT — ACTIVITIES OF DAILY LIVING (ADL): ADLS_ACUITY_SCORE: 35

## 2022-08-29 LAB — BACTERIA UR CULT: NORMAL

## 2022-08-29 NOTE — RESULT ENCOUNTER NOTE
Final urine culture report is negative.  Adult Negative Urine culture parameters per protocol: Any # Urogenital single or mixed organism, <10,000 col/ml single organism (cath/midstream), and > 3 organisms (No susceptibilities performed).  [facility] Emergency [facility] discharge antibiotic prescribed (If applicable): None  Treatment recommendations per [facility] ED Lab Result Urine Culture protocol.

## 2023-02-13 ENCOUNTER — APPOINTMENT (OUTPATIENT)
Dept: ULTRASOUND IMAGING | Facility: CLINIC | Age: 23
End: 2023-02-13
Attending: EMERGENCY MEDICINE
Payer: COMMERCIAL

## 2023-02-13 ENCOUNTER — HOSPITAL ENCOUNTER (EMERGENCY)
Facility: CLINIC | Age: 23
Discharge: HOME | End: 2023-02-13
Attending: EMERGENCY MEDICINE | Admitting: EMERGENCY MEDICINE
Payer: COMMERCIAL

## 2023-02-13 VITALS
BODY MASS INDEX: 29.13 KG/M2 | TEMPERATURE: 98.1 F | HEART RATE: 78 BPM | OXYGEN SATURATION: 96 % | DIASTOLIC BLOOD PRESSURE: 71 MMHG | WEIGHT: 186 LBS | RESPIRATION RATE: 16 BRPM | SYSTOLIC BLOOD PRESSURE: 114 MMHG

## 2023-02-13 DIAGNOSIS — Z33.1 PREGNANT STATE, INCIDENTAL: ICD-10-CM

## 2023-02-13 DIAGNOSIS — O20.0 THREATENED ABORTION: ICD-10-CM

## 2023-02-13 DIAGNOSIS — R10.2 PELVIC CRAMPING: ICD-10-CM

## 2023-02-13 LAB
ALBUMIN SERPL BCG-MCNC: 4.5 G/DL (ref 3.5–5.2)
ALP SERPL-CCNC: 46 U/L (ref 35–104)
ALT SERPL W P-5'-P-CCNC: 12 U/L (ref 10–35)
ANION GAP SERPL CALCULATED.3IONS-SCNC: 9 MMOL/L (ref 7–15)
AST SERPL W P-5'-P-CCNC: 17 U/L (ref 10–35)
BASOPHILS # BLD AUTO: 0 10E3/UL (ref 0–0.2)
BASOPHILS NFR BLD AUTO: 1 %
BILIRUB SERPL-MCNC: <0.2 MG/DL
BUN SERPL-MCNC: 8.5 MG/DL (ref 6–20)
CALCIUM SERPL-MCNC: 9.7 MG/DL (ref 8.6–10)
CHLORIDE SERPL-SCNC: 103 MMOL/L (ref 98–107)
CREAT SERPL-MCNC: 0.66 MG/DL (ref 0.51–0.95)
DEPRECATED HCO3 PLAS-SCNC: 23 MMOL/L (ref 22–29)
EOSINOPHIL # BLD AUTO: 0.1 10E3/UL (ref 0–0.7)
EOSINOPHIL NFR BLD AUTO: 1 %
ERYTHROCYTE [DISTWIDTH] IN BLOOD BY AUTOMATED COUNT: 15.2 % (ref 10–15)
GFR SERPL CREATININE-BSD FRML MDRD: >90 ML/MIN/1.73M2
GLUCOSE SERPL-MCNC: 85 MG/DL (ref 70–99)
HCG INTACT+B SERPL-ACNC: ABNORMAL MIU/ML
HCG UR QL: POSITIVE
HCT VFR BLD AUTO: 34.8 % (ref 35–47)
HGB BLD-MCNC: 11.2 G/DL (ref 11.7–15.7)
IMM GRANULOCYTES # BLD: 0 10E3/UL
IMM GRANULOCYTES NFR BLD: 0 %
INTERNAL QC OK POCT: ABNORMAL
LYMPHOCYTES # BLD AUTO: 2.2 10E3/UL (ref 0.8–5.3)
LYMPHOCYTES NFR BLD AUTO: 39 %
MCH RBC QN AUTO: 24.8 PG (ref 26.5–33)
MCHC RBC AUTO-ENTMCNC: 32.2 G/DL (ref 31.5–36.5)
MCV RBC AUTO: 77 FL (ref 78–100)
MONOCYTES # BLD AUTO: 0.4 10E3/UL (ref 0–1.3)
MONOCYTES NFR BLD AUTO: 6 %
NEUTROPHILS # BLD AUTO: 3 10E3/UL (ref 1.6–8.3)
NEUTROPHILS NFR BLD AUTO: 53 %
NRBC # BLD AUTO: 0 10E3/UL
NRBC BLD AUTO-RTO: 0 /100
PLATELET # BLD AUTO: 243 10E3/UL (ref 150–450)
POCT KIT EXPIRATION DATE: ABNORMAL
POCT KIT LOT NUMBER: ABNORMAL
POTASSIUM SERPL-SCNC: 4.1 MMOL/L (ref 3.4–5.3)
PROT SERPL-MCNC: 7 G/DL (ref 6.4–8.3)
RBC # BLD AUTO: 4.52 10E6/UL (ref 3.8–5.2)
SODIUM SERPL-SCNC: 135 MMOL/L (ref 136–145)
WBC # BLD AUTO: 5.6 10E3/UL (ref 4–11)

## 2023-02-13 PROCEDURE — 76801 OB US < 14 WKS SINGLE FETUS: CPT

## 2023-02-13 PROCEDURE — 80053 COMPREHEN METABOLIC PANEL: CPT | Performed by: EMERGENCY MEDICINE

## 2023-02-13 PROCEDURE — 36415 COLL VENOUS BLD VENIPUNCTURE: CPT | Performed by: EMERGENCY MEDICINE

## 2023-02-13 PROCEDURE — 99285 EMERGENCY DEPT VISIT HI MDM: CPT | Performed by: EMERGENCY MEDICINE

## 2023-02-13 PROCEDURE — 85004 AUTOMATED DIFF WBC COUNT: CPT | Performed by: EMERGENCY MEDICINE

## 2023-02-13 PROCEDURE — 81025 URINE PREGNANCY TEST: CPT | Performed by: EMERGENCY MEDICINE

## 2023-02-13 PROCEDURE — 99284 EMERGENCY DEPT VISIT MOD MDM: CPT | Mod: 25 | Performed by: EMERGENCY MEDICINE

## 2023-02-13 PROCEDURE — 84702 CHORIONIC GONADOTROPIN TEST: CPT | Performed by: EMERGENCY MEDICINE

## 2023-02-13 PROCEDURE — 250N000013 HC RX MED GY IP 250 OP 250 PS 637: Performed by: EMERGENCY MEDICINE

## 2023-02-13 RX ORDER — ACETAMINOPHEN 500 MG
1000 TABLET ORAL ONCE
Status: COMPLETED | OUTPATIENT
Start: 2023-02-13 | End: 2023-02-13

## 2023-02-13 RX ADMIN — ACETAMINOPHEN 1000 MG: 500 TABLET ORAL at 14:37

## 2023-02-13 ASSESSMENT — ACTIVITIES OF DAILY LIVING (ADL)
ADLS_ACUITY_SCORE: 37
ADLS_ACUITY_SCORE: 37

## 2023-02-13 NOTE — DISCHARGE INSTRUCTIONS
Your ultrasound was performed but not read by the radiologist --most likely interpretations would include an ectopic pregnancy or a threatened miscarriage.  You are leaving AGAINST MEDICAL ADVICE but I welcome back to the emergency department anytime.  The results of your ultrasound should appear on the Rubikloud tyra.    Please make an appointment to follow up with OB/Gyn - [facility] (phone: [phone]) as soon as possible even if entirely better.  For pain, For pain, please take 975-1000mg acetaminophen (tylenol) every 8 hours -- do not take more than 3000mg in a 24 hour period.

## 2023-02-13 NOTE — ED NOTES
Bed: UREDH-H  Expected date:   Expected time:   Means of arrival:   Comments:  Preethi 621 35M ETOH with baclofen.

## 2023-02-13 NOTE — ED PROVIDER NOTES
[facility] EMERGENCY DEPARTMENT ([facility])    2/13/23      ED PROVIDER NOTE  [facility] [facility]  History     Chief Complaint   Patient presents with     Pregnancy Test     Took pregnancy test 3 days ago, wants confirmation     Abdominal Pain     Cramping on lower abd. Light spotting for the past 3 days     The history is provided by the patient and medical records.     Maynor Palomo is a 22 year old female with LMP 1/4/2023 and had a positive pregnancy test 2 days ago. She presents with pelvic cramping and light spotting and concern for an ectopic pregnancy.      This part of the document was transcribed by Arin Espinosa, Medical Scribe.    Past Medical History  Past Medical History:   Diagnosis Date     Constipation      Depressive disorder      Migraines      Past Surgical History:   Procedure Laterality Date     BIOPSY LYMPH NODE CERVICAL       DILATION AND CURETTAGE SUCTION N/A 9/7/2021    Procedure: DILATION AND CURETTAGE, UTERUS, USING SUCTION;  Surgeon: Yessi Mcnamara MD;  Location: [facility] OR     LAPAROSCOPIC APPENDECTOMY N/A 1/4/2017    Procedure: LAPAROSCOPIC APPENDECTOMY;  Surgeon: Erick Casper MD;  Location: [facility] OR     TONSILLECTOMY, ADENOIDECTOMY WITH SHAVER, COMBINED       cetirizine (ZYRTEC) 10 MG tablet  ibuprofen (ADVIL/MOTRIN) 600 MG tablet      Allergies   Allergen Reactions     Lactose GI Disturbance, Diarrhea, Dizziness, Nausea and Vomiting, Nausea and Other (See Comments)     Other reaction(s): GI Disturbance  Other reaction(s): GI Disturbance  Other reaction(s): GI Disturbance  Other reaction(s): GI Disturbance    Other reaction(s): GI Disturbance  Other reaction(s): Nausea Only, Other (see comments)  Other reaction(s): GI Disturbance  Other reaction(s): GI Disturbance  Other reaction(s): GI Disturbance  Other reaction(s): GI Disturbance       Naproxen      Penicillins      Family History  Family History   Problem Relation Age of Onset     Depression Mother      Substance  Abuse Mother         sober 6 yrs     Depression Father      Mental Illness Father         anger issues     Substance Abuse Father         current user     Depression Maternal Grandmother      Substance Abuse Maternal Grandmother      Mental Illness Paternal Grandmother      Substance Abuse Paternal Grandmother      Mental Illness Paternal Grandfather      Substance Abuse Paternal Grandfather      Mental Illness Paternal Aunt      Substance Abuse Paternal Aunt      Mental Illness Paternal Uncle      Substance Abuse Paternal Uncle      Social History   Social History     Tobacco Use     Smoking status: Some Days     Packs/day: 1.00     Types: Cigarettes     Last attempt to quit: 5/15/2020     Years since quittin.7     Smokeless tobacco: Never   Substance Use Topics     Alcohol use: Not Currently     Alcohol/week: 0.0 standard drinks     Drug use: Not Currently     Types: Marijuana         A medically appropriate review of systems was performed with pertinent positives and negatives noted in the HPI, and all other systems negative.    Physical Exam   BP: 114/71  Pulse: 78  Temp: 98.1  F (36.7  C)  Resp: 16  Weight: 84.4 kg (186 lb)  SpO2: 96 %  Physical Exam  Constitutional:       General: She is not in acute distress.     Appearance: She is not diaphoretic.   HENT:      Head: Atraumatic.      Mouth/Throat:      Pharynx: No oropharyngeal exudate.   Eyes:      General: No scleral icterus.     Pupils: Pupils are equal, round, and reactive to light.   Cardiovascular:      Heart sounds: Normal heart sounds.   Pulmonary:      Effort: No respiratory distress.      Breath sounds: Normal breath sounds.   Abdominal:      General: Bowel sounds are normal.      Palpations: Abdomen is soft.      Tenderness: There is no abdominal tenderness.   Genitourinary:     Comments: Pelvic exam deferred initially  Musculoskeletal:         General: No tenderness.   Skin:     General: Skin is warm.      Findings: No rash.           ED  Course, Procedures, & Data     ED Course as of 02/13/23 1753   Mon Feb 13, 2023   1502 Patient has decided to leave AGAINST MEDICAL ADVICE before ultrasound results are returned.  Patient has had 1 ectopic pregnancy in the past and understands the risks.  Patient needs to go  her children.  I have welcomed the patient back and refer the patient to OB/GYN     Procedures                     Results for orders placed or performed during the hospital encounter of 02/13/23   CBC with platelets and differential     Status: Abnormal   Result Value Ref Range    WBC Count 5.6 4.0 - 11.0 10e3/uL    RBC Count 4.52 3.80 - 5.20 10e6/uL    Hemoglobin 11.2 (L) 11.7 - 15.7 g/dL    Hematocrit 34.8 (L) 35.0 - 47.0 %    MCV 77 (L) 78 - 100 fL    MCH 24.8 (L) 26.5 - 33.0 pg    MCHC 32.2 31.5 - 36.5 g/dL    RDW 15.2 (H) 10.0 - 15.0 %    Platelet Count 243 150 - 450 10e3/uL    % Neutrophils 53 %    % Lymphocytes 39 %    % Monocytes 6 %    % Eosinophils 1 %    % Basophils 1 %    % Immature Granulocytes 0 %    NRBCs per 100 WBC 0 <1 /100    Absolute Neutrophils 3.0 1.6 - 8.3 10e3/uL    Absolute Lymphocytes 2.2 0.8 - 5.3 10e3/uL    Absolute Monocytes 0.4 0.0 - 1.3 10e3/uL    Absolute Eosinophils 0.1 0.0 - 0.7 10e3/uL    Absolute Basophils 0.0 0.0 - 0.2 10e3/uL    Absolute Immature Granulocytes 0.0 <=0.4 10e3/uL    Absolute NRBCs 0.0 10e3/uL   hCG qual urine POCT     Status: Abnormal   Result Value Ref Range    HCG Qual Urine Positive Negative    Internal QC Check POCT Valid Valid    POCT Kit Lot Number [id number]     POCT Kit Expiration Date 06/30/2023    CBC with platelets differential     Status: Abnormal    Narrative    The following orders were created for panel order CBC with platelets differential.  Procedure                               Abnormality         Status                     ---------                               -----------         ------                     CBC with platelets and d...[[id number]]  Abnormal             Final result                 Please view results for these tests on the individual orders.     Medications - No data to display  Labs Ordered and Resulted from Time of ED Arrival to Time of ED Departure   CBC WITH PLATELETS AND DIFFERENTIAL - Abnormal       Result Value    WBC Count 5.6      RBC Count 4.52      Hemoglobin 11.2 (*)     Hematocrit 34.8 (*)     MCV 77 (*)     MCH 24.8 (*)     MCHC 32.2      RDW 15.2 (*)     Platelet Count 243      % Neutrophils 53      % Lymphocytes 39      % Monocytes 6      % Eosinophils 1      % Basophils 1      % Immature Granulocytes 0      NRBCs per 100 WBC 0      Absolute Neutrophils 3.0      Absolute Lymphocytes 2.2      Absolute Monocytes 0.4      Absolute Eosinophils 0.1      Absolute Basophils 0.0      Absolute Immature Granulocytes 0.0      Absolute NRBCs 0.0     HCG QUALITATIVE URINE POCT - Abnormal    HCG Qual Urine Positive      Internal QC Check POCT Valid      POCT Kit Lot Number [id number]      POCT Kit Expiration Date 2023     HCG QUANTITATIVE PREGNANCY   COMPREHENSIVE METABOLIC PANEL   ABO/RH TYPE AND SCREEN     US OB <14 Weeks W Transvaginal    (Results Pending)          Medical Decision Making  The patient's presentation is strongly suggestive of an acute illness with systemic symptoms and an acute health issue posing potential threat to life or bodily function.    The patient's evaluation involved:  ordering and/or review of 3+ test(s) in this encounter (see separate area of note for details)    The patient's management involved prescription drug management (including medications given in the ED), a decision regarding emergency major surgery and a decision regarding hospitalization.      Assessment & Plan    Concern for ectopic pregnancy versus threatened   Plan: labs with hCG and type and screen, transvaginal ultrasound, extra strength Tylenol for pain and reassess    Patient chose to leave AGAINST MEDICAL ADVICE due to the need to provide her own  children with childcare but understands the risks of an ectopic pregnancy.      I reviewed the preliminary read showing no ectopic pregnancy and in the existence IUP --I called the patient directly on her cell phone and relayed this reassuring information.  She will see outpatient OB/GYN    This part of the document was transcribed by Arin Espinosa, Medical Scribe.    I have reviewed the nursing notes. I have reviewed the findings, diagnosis, plan and need for follow up with the patient.    New Prescriptions    No medications on file       Final diagnoses:   Pregnant state, incidental   Threatened    Pelvic cramping       Chano Bo MD  [facility] EMERGENCY DEPARTMENT  2023     Chano Bo MD  23 [id number]

## 2023-02-13 NOTE — ED TRIAGE NOTES
Cramping on lower abd. Light spotting for the past 3 days  Took pregnancy test 3 days ago, wants confirmation     Triage Assessment     Row Name 02/13/23 1131       Triage Assessment (Adult)    Airway WDL WDL       Respiratory WDL    Respiratory WDL WDL       Skin Circulation/Temperature WDL    Skin Circulation/Temperature WDL WDL       Cardiac WDL    Cardiac WDL WDL       Peripheral/Neurovascular WDL    Peripheral Neurovascular WDL WDL       Cognitive/Neuro/Behavioral WDL    Cognitive/Neuro/Behavioral WDL WDL

## 2023-06-28 ENCOUNTER — HOSPITAL ENCOUNTER (EMERGENCY)
Facility: CLINIC | Age: 23
Discharge: HOME | End: 2023-06-28
Attending: EMERGENCY MEDICINE | Admitting: EMERGENCY MEDICINE

## 2023-06-28 ENCOUNTER — HOSPITAL ENCOUNTER (OUTPATIENT)
Facility: CLINIC | Age: 23
End: 2023-06-28
Admitting: SPECIALIST
Payer: COMMERCIAL

## 2023-06-28 ENCOUNTER — HOSPITAL ENCOUNTER (OUTPATIENT)
Facility: CLINIC | Age: 23
Discharge: HOME | End: 2023-06-28
Attending: SPECIALIST | Admitting: SPECIALIST
Payer: COMMERCIAL

## 2023-06-28 ENCOUNTER — APPOINTMENT (OUTPATIENT)
Dept: GENERAL RADIOLOGY | Facility: CLINIC | Age: 23
End: 2023-06-28
Attending: EMERGENCY MEDICINE

## 2023-06-28 VITALS — TEMPERATURE: 98.2 F | SYSTOLIC BLOOD PRESSURE: 110 MMHG | DIASTOLIC BLOOD PRESSURE: 61 MMHG | RESPIRATION RATE: 16 BRPM

## 2023-06-28 VITALS
DIASTOLIC BLOOD PRESSURE: 72 MMHG | TEMPERATURE: 98.2 F | OXYGEN SATURATION: 100 % | SYSTOLIC BLOOD PRESSURE: 128 MMHG | HEART RATE: 94 BPM | RESPIRATION RATE: 18 BRPM

## 2023-06-28 DIAGNOSIS — S16.1XXA CERVICAL STRAIN, INITIAL ENCOUNTER: ICD-10-CM
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
## 2023-06-28 DIAGNOSIS — Z34.92 SECOND TRIMESTER PREGNANCY: ICD-10-CM

## 2023-06-28 DIAGNOSIS — M25.512 ACUTE PAIN OF LEFT SHOULDER: ICD-10-CM

## 2023-06-28 LAB
FETAL RBC % LFV: 0 %
FETAL RBC (ML): 0 ML
IF INDICATED RECOMMENDED DOSE OF RH IMMUNE GLOBULIN UG: 300 UG

## 2023-06-28 PROCEDURE — G0463 HOSPITAL OUTPT CLINIC VISIT: HCPCS

## 2023-06-28 PROCEDURE — 36415 COLL VENOUS BLD VENIPUNCTURE: CPT | Performed by: SPECIALIST

## 2023-06-28 PROCEDURE — 250N000011 HC RX IP 250 OP 636: Performed by: EMERGENCY MEDICINE

## 2023-06-28 PROCEDURE — 73030 X-RAY EXAM OF SHOULDER: CPT | Mod: LT

## 2023-06-28 PROCEDURE — 72040 X-RAY EXAM NECK SPINE 2-3 VW: CPT

## 2023-06-28 PROCEDURE — 85460 HEMOGLOBIN FETAL: CPT | Performed by: SPECIALIST

## 2023-06-28 PROCEDURE — 99285 EMERGENCY DEPT VISIT HI MDM: CPT | Mod: 25

## 2023-06-28 PROCEDURE — 250N000013 HC RX MED GY IP 250 OP 250 PS 637: Performed by: EMERGENCY MEDICINE

## 2023-06-28 RX ORDER — ONDANSETRON 4 MG/1
4 TABLET, ORALLY DISINTEGRATING ORAL ONCE
Status: COMPLETED | OUTPATIENT
Start: 2023-06-28 | End: 2023-06-28

## 2023-06-28 RX ORDER — VITAMIN A ACETATE, .BETA.-CAROTENE, ASCORBIC ACID, CHOLECALCIFEROL, .ALPHA.-TOCOPHEROL ACETATE, DL-, THIAMINE MONONITRATE, RIBOFLAVIN, NIACINAMIDE, PYRIDOXINE HYDROCHLORIDE, FOLIC ACID, CYANOCOBALAMIN, CALCIUM CARBONATE, FERROUS FUMARATE, ZINC OXIDE, AND CUPRIC OXIDE 2000; 2000; 120; 400; 22; 1.84; 3; 20; 10; 1; 12; 200; 27; 25; 2 [IU]/1; [IU]/1; MG/1; [IU]/1; MG/1; MG/1; MG/1; MG/1; MG/1; MG/1; UG/1; MG/1; MG/1; MG/1; MG/1
1 TABLET ORAL DAILY
COMMUNITY

## 2023-06-28 RX ORDER — ONDANSETRON 4 MG/1
4 TABLET, ORALLY DISINTEGRATING ORAL EVERY 6 HOURS PRN
Status: DISCONTINUED | OUTPATIENT
Start: 2023-06-28 | End: 2023-06-28 | Stop reason: HOSPADM

## 2023-06-28 RX ORDER — PROCHLORPERAZINE 25 MG
25 SUPPOSITORY, RECTAL RECTAL EVERY 12 HOURS PRN
Status: DISCONTINUED | OUTPATIENT
Start: 2023-06-28 | End: 2023-06-28 | Stop reason: HOSPADM

## 2023-06-28 RX ORDER — ONDANSETRON 2 MG/ML
4 INJECTION INTRAMUSCULAR; INTRAVENOUS EVERY 6 HOURS PRN
Status: DISCONTINUED | OUTPATIENT
Start: 2023-06-28 | End: 2023-06-28 | Stop reason: HOSPADM

## 2023-06-28 RX ORDER — ACETAMINOPHEN 500 MG
1000 TABLET ORAL ONCE
Status: COMPLETED | OUTPATIENT
Start: 2023-06-28 | End: 2023-06-28

## 2023-06-28 RX ORDER — PROCHLORPERAZINE MALEATE 5 MG
10 TABLET ORAL EVERY 6 HOURS PRN
Status: DISCONTINUED | OUTPATIENT
Start: 2023-06-28 | End: 2023-06-28 | Stop reason: HOSPADM

## 2023-06-28 RX ORDER — METOCLOPRAMIDE 10 MG/1
10 TABLET ORAL EVERY 6 HOURS PRN
Status: DISCONTINUED | OUTPATIENT
Start: 2023-06-28 | End: 2023-06-28 | Stop reason: HOSPADM

## 2023-06-28 RX ORDER — METOCLOPRAMIDE HYDROCHLORIDE 5 MG/ML
10 INJECTION INTRAMUSCULAR; INTRAVENOUS EVERY 6 HOURS PRN
Status: DISCONTINUED | OUTPATIENT
Start: 2023-06-28 | End: 2023-06-28 | Stop reason: HOSPADM

## 2023-06-28 RX ADMIN — ONDANSETRON 4 MG: 4 TABLET, ORALLY DISINTEGRATING ORAL at 14:44

## 2023-06-28 RX ADMIN — ACETAMINOPHEN 1000 MG: 500 TABLET ORAL at 14:44

## 2023-06-28 ASSESSMENT — ACTIVITIES OF DAILY LIVING (ADL)
ADLS_ACUITY_SCORE: 20
ADLS_ACUITY_SCORE: 37
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
## 2023-06-28 NOTE — DISCHARGE INSTRUCTIONS
Ice unit of pain 20 minutes, 4 times per day for the neck several days  Tylenol as needed for pain control

## 2024-11-27 NOTE — TELEPHONE ENCOUNTER
----- Message from Soraya Daly MD sent at 5/29/2017  1:32 PM CDT -----  Pt seen in peds ED on Sunday with microhyphema, traumatic iritis, and small corneal abrasion OS. She was supposed to f/u with me in clinic on Monday but did not show up. Attempted to contact pt but no answer. Please try to contact pt again as she should be seen for f/u in peds clinic (or adult clinic? I think she turns 17 soon) early this week.   Per 09/06/2024 telephone encounter

## (undated) DEVICE — ENDO POUCH GOLD 10MM ECATCH 173050G

## (undated) DEVICE — ENDO TROCAR BLUNT 100MM W/THRD ANCHOR BLUNTPORT BPT12STS

## (undated) DEVICE — ENDO TROCAR OPTICAL 05MM VERSAPORT PLUS W/FIX CAN ONB5STF

## (undated) DEVICE — LINEN GOWN X4 5410

## (undated) DEVICE — DECANTER TRANSFER DEVICE 2008S

## (undated) DEVICE — SU MONOCRYL 4-0 PS-2 18" UND Y496G

## (undated) DEVICE — PREP CHLORAPREP 26ML TINTED ORANGE  260815

## (undated) DEVICE — SOL NACL 0.9% IRRIG 1000ML BOTTLE 2F7124

## (undated) DEVICE — ESU HOLSTER PLASTIC DISP E2400

## (undated) DEVICE — Device

## (undated) DEVICE — GOWN XLG DISP 9545

## (undated) DEVICE — ENDO CANNULA FIXATION OPTICAL 05MM VERSAPORT PLUS ONBFCA5ST

## (undated) DEVICE — ESU PENCIL W/COATED BLADE E2450H

## (undated) DEVICE — ADHESIVE SWIFTSET 0.8ML OCTYL SS6

## (undated) DEVICE — PAD CHUX UNDERPAD 30X36" P3036C

## (undated) DEVICE — DECANTER VIAL 2006S

## (undated) DEVICE — DRAPE POUCH INSTRUMENT 3 POCKET 1018L

## (undated) DEVICE — GLOVE PROTEXIS W/NEU-THERA 8.0  2D73TE80

## (undated) DEVICE — GLOVE PROTEXIS W/NEU-THERA 6.5  2D73TE65

## (undated) DEVICE — GLOVE PROTEXIS BLUE W/NEU-THERA 7.0  2D73EB70

## (undated) DEVICE — TUBING SUCTION VACUUM COLLECTION 6FT 610

## (undated) DEVICE — LINEN TOWEL PACK X5 5464

## (undated) DEVICE — SPECIMEN TRAP VACUUM SUCTION SAFETOUCH 003853-902

## (undated) DEVICE — TUBING VACUUM COLLECTION 6FT 23116

## (undated) DEVICE — SU VICRYL 0 CT-2 27" J334H

## (undated) DEVICE — SUCTION CANNULA UTERINE 07MM CVD 022107-10

## (undated) DEVICE — SOL WATER IRRIG 1000ML BOTTLE 07139-09

## (undated) DEVICE — SOL NACL 0.9% IRRIG 1000ML BOTTLE 07138-09

## (undated) DEVICE — SUCTION VACUUM CANISTER STANDARD W/LID&CAPS 003987-901

## (undated) DEVICE — STPL ENDO GIA 30X2.5MM 030811

## (undated) DEVICE — ESU LIGASURE MARYLAND JAW OPEN SEALER/DVDR 5MMX37CM LF1737

## (undated) RX ORDER — ONDANSETRON 2 MG/ML
INJECTION INTRAMUSCULAR; INTRAVENOUS
Status: DISPENSED
Start: 2017-01-04

## (undated) RX ORDER — LIDOCAINE HYDROCHLORIDE 10 MG/ML
INJECTION, SOLUTION EPIDURAL; INFILTRATION; INTRACAUDAL; PERINEURAL
Status: DISPENSED
Start: 2017-01-04

## (undated) RX ORDER — BUPIVACAINE HYDROCHLORIDE AND EPINEPHRINE 2.5; 5 MG/ML; UG/ML
INJECTION, SOLUTION INFILTRATION; PERINEURAL
Status: DISPENSED
Start: 2017-01-04

## (undated) RX ORDER — PROPOFOL 10 MG/ML
INJECTION, EMULSION INTRAVENOUS
Status: DISPENSED
Start: 2017-01-04

## (undated) RX ORDER — FENTANYL CITRATE 50 UG/ML
INJECTION, SOLUTION INTRAMUSCULAR; INTRAVENOUS
Status: DISPENSED
Start: 2017-01-04

## (undated) RX ORDER — NEOSTIGMINE METHYLSULFATE 5 MG/5 ML
SYRINGE (ML) INTRAVENOUS
Status: DISPENSED
Start: 2017-01-04

## (undated) RX ORDER — DIPHENHYDRAMINE HYDROCHLORIDE 50 MG/ML
INJECTION INTRAMUSCULAR; INTRAVENOUS
Status: DISPENSED
Start: 2017-01-04

## (undated) RX ORDER — KETOROLAC TROMETHAMINE 30 MG/ML
INJECTION, SOLUTION INTRAMUSCULAR; INTRAVENOUS
Status: DISPENSED
Start: 2017-01-04

## (undated) RX ORDER — HYDROXYZINE HYDROCHLORIDE 25 MG/1
TABLET, FILM COATED ORAL
Status: DISPENSED
Start: 2017-01-04

## (undated) RX ORDER — DEXAMETHASONE SODIUM PHOSPHATE 4 MG/ML
INJECTION, SOLUTION INTRA-ARTICULAR; INTRALESIONAL; INTRAMUSCULAR; INTRAVENOUS; SOFT TISSUE
Status: DISPENSED
Start: 2017-01-04

## (undated) RX ORDER — DOXYCYCLINE 100 MG/1
CAPSULE ORAL
Status: DISPENSED
Start: 2021-09-07

## (undated) RX ORDER — ACETAMINOPHEN 325 MG/1
TABLET ORAL
Status: DISPENSED
Start: 2021-09-07

## (undated) RX ORDER — GLYCOPYRROLATE 0.2 MG/ML
INJECTION, SOLUTION INTRAMUSCULAR; INTRAVENOUS
Status: DISPENSED
Start: 2017-01-04